# Patient Record
Sex: FEMALE | Race: BLACK OR AFRICAN AMERICAN | NOT HISPANIC OR LATINO | URBAN - METROPOLITAN AREA
[De-identification: names, ages, dates, MRNs, and addresses within clinical notes are randomized per-mention and may not be internally consistent; named-entity substitution may affect disease eponyms.]

---

## 2018-12-03 ENCOUNTER — INPATIENT (INPATIENT)
Facility: HOSPITAL | Age: 73
LOS: 3 days | Discharge: ANOTHER HOSPITAL | End: 2018-12-07
Attending: INTERNAL MEDICINE | Admitting: INTERNAL MEDICINE
Payer: COMMERCIAL

## 2018-12-03 VITALS
SYSTOLIC BLOOD PRESSURE: 146 MMHG | TEMPERATURE: 98 F | WEIGHT: 209 LBS | DIASTOLIC BLOOD PRESSURE: 84 MMHG | OXYGEN SATURATION: 98 % | HEIGHT: 66 IN | RESPIRATION RATE: 16 BRPM | HEART RATE: 68 BPM

## 2018-12-03 LAB
APPEARANCE UR: CLEAR — SIGNIFICANT CHANGE UP
BASOPHILS # BLD AUTO: 0.05 K/UL — SIGNIFICANT CHANGE UP (ref 0–0.2)
BASOPHILS NFR BLD AUTO: 0.6 % — SIGNIFICANT CHANGE UP (ref 0–2)
BILIRUB UR-MCNC: NEGATIVE — SIGNIFICANT CHANGE UP
CK SERPL-CCNC: 173 U/L — SIGNIFICANT CHANGE UP (ref 26–192)
COLOR SPEC: YELLOW — SIGNIFICANT CHANGE UP
DIFF PNL FLD: NEGATIVE — SIGNIFICANT CHANGE UP
EOSINOPHIL # BLD AUTO: 0.27 K/UL — SIGNIFICANT CHANGE UP (ref 0–0.5)
EOSINOPHIL NFR BLD AUTO: 3.5 % — SIGNIFICANT CHANGE UP (ref 0–6)
GLUCOSE UR QL: NEGATIVE MG/DL — SIGNIFICANT CHANGE UP
HCT VFR BLD CALC: 37.7 % — SIGNIFICANT CHANGE UP (ref 34.5–45)
HGB BLD-MCNC: 12.3 G/DL — SIGNIFICANT CHANGE UP (ref 11.5–15.5)
IMM GRANULOCYTES NFR BLD AUTO: 0.4 % — SIGNIFICANT CHANGE UP (ref 0–1.5)
KETONES UR-MCNC: NEGATIVE — SIGNIFICANT CHANGE UP
LEUKOCYTE ESTERASE UR-ACNC: ABNORMAL
LYMPHOCYTES # BLD AUTO: 2.88 K/UL — SIGNIFICANT CHANGE UP (ref 1–3.3)
LYMPHOCYTES # BLD AUTO: 37.1 % — SIGNIFICANT CHANGE UP (ref 13–44)
MCHC RBC-ENTMCNC: 27.2 PG — SIGNIFICANT CHANGE UP (ref 27–34)
MCHC RBC-ENTMCNC: 32.6 GM/DL — SIGNIFICANT CHANGE UP (ref 32–36)
MCV RBC AUTO: 83.4 FL — SIGNIFICANT CHANGE UP (ref 80–100)
MONOCYTES # BLD AUTO: 0.5 K/UL — SIGNIFICANT CHANGE UP (ref 0–0.9)
MONOCYTES NFR BLD AUTO: 6.4 % — SIGNIFICANT CHANGE UP (ref 2–14)
NEUTROPHILS # BLD AUTO: 4.04 K/UL — SIGNIFICANT CHANGE UP (ref 1.8–7.4)
NEUTROPHILS NFR BLD AUTO: 52 % — SIGNIFICANT CHANGE UP (ref 43–77)
NITRITE UR-MCNC: NEGATIVE — SIGNIFICANT CHANGE UP
NRBC # BLD: 0 /100 WBCS — SIGNIFICANT CHANGE UP (ref 0–0)
PH UR: 5 — SIGNIFICANT CHANGE UP (ref 5–8)
PLATELET # BLD AUTO: 292 K/UL — SIGNIFICANT CHANGE UP (ref 150–400)
PROT UR-MCNC: NEGATIVE MG/DL — SIGNIFICANT CHANGE UP
RBC # BLD: 4.52 M/UL — SIGNIFICANT CHANGE UP (ref 3.8–5.2)
RBC # FLD: 13.4 % — SIGNIFICANT CHANGE UP (ref 10.3–14.5)
SP GR SPEC: 1.01 — SIGNIFICANT CHANGE UP (ref 1.01–1.02)
TROPONIN I SERPL-MCNC: 0.04 NG/ML — SIGNIFICANT CHANGE UP (ref 0.01–0.04)
UROBILINOGEN FLD QL: NEGATIVE MG/DL — SIGNIFICANT CHANGE UP
WBC # BLD: 7.77 K/UL — SIGNIFICANT CHANGE UP (ref 3.8–10.5)
WBC # FLD AUTO: 7.77 K/UL — SIGNIFICANT CHANGE UP (ref 3.8–10.5)

## 2018-12-03 PROCEDURE — 99285 EMERGENCY DEPT VISIT HI MDM: CPT

## 2018-12-03 RX ORDER — SODIUM CHLORIDE 9 MG/ML
1000 INJECTION INTRAMUSCULAR; INTRAVENOUS; SUBCUTANEOUS ONCE
Qty: 0 | Refills: 0 | Status: COMPLETED | OUTPATIENT
Start: 2018-12-03 | End: 2018-12-03

## 2018-12-03 RX ADMIN — SODIUM CHLORIDE 1000 MILLILITER(S): 9 INJECTION INTRAMUSCULAR; INTRAVENOUS; SUBCUTANEOUS at 23:22

## 2018-12-03 NOTE — ED ADULT NURSE NOTE - PMH
Arthritis    Asthma, unspecified asthma severity, unspecified whether complicated, unspecified whether persistent

## 2018-12-03 NOTE — ED PROVIDER NOTE - MEDICAL DECISION MAKING DETAILS
Patient with intermittent left sided chest pain and shortness of breath.  Patient's initial EKG was normal, serial EKG shows bradycardia with occasional PACs.  Patient's serial enzymes are normal but trending up.  Will admit for cardiac work up, r/o ACS.  Needs cardiology consult.  Patient is to be admitted to the hospital and the case was discussed with the admitting physician.  Any changes in plan, additional imaging/labs, and further work up will be at the discretion of the admitting physician.

## 2018-12-03 NOTE — ED ADULT TRIAGE NOTE - CHIEF COMPLAINT QUOTE
Patient states I wasn't feeling well so I went to urgent care because I was breathing very fast, they did an ekg and gave me a referral to come here. denies fast breathing in triage

## 2018-12-03 NOTE — ED PROVIDER NOTE - OBJECTIVE STATEMENT
Pertinent PMH/PSH/FHx/SHx and Review of Systems contained within:  Patient presents to the ED for chest pain.  Patient is well appearing, was sent from urgent care for episodes of left sided chest pain under her breast.  Says that for 2 weeks now has been having intermittent left sided chest pain accompanied by palpitations and shortness of breath.  No aggravating or relieving factors.  Currently not having any pain.  Had 2 episodes of nonbloody, nonbilious vomiting today, denies any pain or shortness of breath associated with vomiting.  Denies diarrhea or melena.  Denies fever, cough, hemoptysis, lower extremity swelling or edema. Patient went to urgent care today where EKG was done showing NSR with 1st degree block, patient sent to ER for more extensive work up.     ROS: No fever/chills, No headache/photophobia/eye pain/changes in vision, No ear pain/sore throat/dysphagia, no cough/wheeze/stridor, No abdominal pain, No N/V/D/melena, no dysuria/frequency/discharge, No neck/back pain, no rash, no changes in neurological status/function.

## 2018-12-03 NOTE — ED ADULT NURSE NOTE - CHPI ED NUR SYMPTOMS NEG
no tingling/no vomiting/no nausea/no decreased eating/drinking/no pain/no fever/no chills/no weakness

## 2018-12-04 DIAGNOSIS — R07.89 OTHER CHEST PAIN: ICD-10-CM

## 2018-12-04 DIAGNOSIS — R00.1 BRADYCARDIA, UNSPECIFIED: ICD-10-CM

## 2018-12-04 DIAGNOSIS — J45.909 UNSPECIFIED ASTHMA, UNCOMPLICATED: ICD-10-CM

## 2018-12-04 LAB
ALBUMIN SERPL ELPH-MCNC: 3.4 G/DL — SIGNIFICANT CHANGE UP (ref 3.3–5)
ALP SERPL-CCNC: 99 U/L — SIGNIFICANT CHANGE UP (ref 40–120)
ALT FLD-CCNC: 28 U/L — SIGNIFICANT CHANGE UP (ref 12–78)
AMYLASE P1 CFR SERPL: 68 U/L — SIGNIFICANT CHANGE UP (ref 25–115)
ANION GAP SERPL CALC-SCNC: 7 MMOL/L — SIGNIFICANT CHANGE UP (ref 5–17)
AST SERPL-CCNC: 25 U/L — SIGNIFICANT CHANGE UP (ref 15–37)
BACTERIA # UR AUTO: ABNORMAL
BILIRUB SERPL-MCNC: 0.4 MG/DL — SIGNIFICANT CHANGE UP (ref 0.2–1.2)
BUN SERPL-MCNC: 17 MG/DL — SIGNIFICANT CHANGE UP (ref 7–23)
CALCIUM SERPL-MCNC: 8.9 MG/DL — SIGNIFICANT CHANGE UP (ref 8.5–10.1)
CHLORIDE SERPL-SCNC: 109 MMOL/L — HIGH (ref 96–108)
CO2 SERPL-SCNC: 26 MMOL/L — SIGNIFICANT CHANGE UP (ref 22–31)
CREAT SERPL-MCNC: 1.14 MG/DL — SIGNIFICANT CHANGE UP (ref 0.5–1.3)
GLUCOSE SERPL-MCNC: 111 MG/DL — HIGH (ref 70–99)
LIDOCAIN IGE QN: 270 U/L — SIGNIFICANT CHANGE UP (ref 73–393)
POTASSIUM SERPL-MCNC: 4.5 MMOL/L — SIGNIFICANT CHANGE UP (ref 3.5–5.3)
POTASSIUM SERPL-SCNC: 4.5 MMOL/L — SIGNIFICANT CHANGE UP (ref 3.5–5.3)
PROT SERPL-MCNC: 8.2 GM/DL — SIGNIFICANT CHANGE UP (ref 6–8.3)
SODIUM SERPL-SCNC: 142 MMOL/L — SIGNIFICANT CHANGE UP (ref 135–145)
TROPONIN I SERPL-MCNC: 0.04 NG/ML — SIGNIFICANT CHANGE UP (ref 0.01–0.04)
TSH SERPL-MCNC: 1.66 UU/ML — SIGNIFICANT CHANGE UP (ref 0.36–3.74)
WBC UR QL: SIGNIFICANT CHANGE UP

## 2018-12-04 PROCEDURE — 99223 1ST HOSP IP/OBS HIGH 75: CPT

## 2018-12-04 PROCEDURE — 12345: CPT | Mod: NC

## 2018-12-04 PROCEDURE — 71275 CT ANGIOGRAPHY CHEST: CPT | Mod: 26

## 2018-12-04 PROCEDURE — 93306 TTE W/DOPPLER COMPLETE: CPT | Mod: 26

## 2018-12-04 RX ORDER — HYDRALAZINE HCL 50 MG
25 TABLET ORAL EVERY 12 HOURS
Qty: 0 | Refills: 0 | Status: DISCONTINUED | OUTPATIENT
Start: 2018-12-04 | End: 2018-12-07

## 2018-12-04 RX ORDER — REGADENOSON 0.08 MG/ML
0.4 INJECTION, SOLUTION INTRAVENOUS ONCE
Qty: 0 | Refills: 0 | Status: COMPLETED | OUTPATIENT
Start: 2018-12-04 | End: 2018-12-05

## 2018-12-04 RX ORDER — ASPIRIN/CALCIUM CARB/MAGNESIUM 324 MG
81 TABLET ORAL DAILY
Qty: 0 | Refills: 0 | Status: DISCONTINUED | OUTPATIENT
Start: 2018-12-05 | End: 2018-12-07

## 2018-12-04 RX ORDER — ACETAMINOPHEN 500 MG
650 TABLET ORAL ONCE
Qty: 0 | Refills: 0 | Status: COMPLETED | OUTPATIENT
Start: 2018-12-04 | End: 2018-12-04

## 2018-12-04 RX ORDER — AMLODIPINE BESYLATE 2.5 MG/1
5 TABLET ORAL DAILY
Qty: 0 | Refills: 0 | Status: DISCONTINUED | OUTPATIENT
Start: 2018-12-04 | End: 2018-12-04

## 2018-12-04 RX ORDER — ASPIRIN/CALCIUM CARB/MAGNESIUM 324 MG
325 TABLET ORAL ONCE
Qty: 0 | Refills: 0 | Status: COMPLETED | OUTPATIENT
Start: 2018-12-04 | End: 2018-12-04

## 2018-12-04 RX ADMIN — Medication 650 MILLIGRAM(S): at 00:29

## 2018-12-04 RX ADMIN — SODIUM CHLORIDE 1000 MILLILITER(S): 9 INJECTION INTRAMUSCULAR; INTRAVENOUS; SUBCUTANEOUS at 00:25

## 2018-12-04 RX ADMIN — Medication 650 MILLIGRAM(S): at 02:00

## 2018-12-04 RX ADMIN — Medication 325 MILLIGRAM(S): at 06:03

## 2018-12-04 RX ADMIN — Medication 25 MILLIGRAM(S): at 17:44

## 2018-12-04 NOTE — H&P ADULT - HISTORY OF PRESENT ILLNESS
73y Female PMH asthma, arthritis, presents to the ED for chest pain.  Patient is well appearing, was sent from urgent care for episodes of left sided chest pain under her breast.  Says that for 2 weeks now has been having intermittent left sided chest pain accompanied by palpitations and shortness of breath.  No aggravating or relieving factors. Currently not having any pain.  Had 2 episodes of nonbloody, nonbilious vomiting today, denies any pain or shortness of breath associated with vomiting.  Denies diarrhea or melena.  Denies fever, cough, hemoptysis, lower extremity swelling or edema. Patient went to urgent care today where EKG was done showing NSR with 1st degree block, patient sent to ER for more extensive work up.

## 2018-12-04 NOTE — H&P ADULT - NSHPPHYSICALEXAM_GEN_ALL_CORE
T(C): 36.8 (04 Dec 2018 07:19), Max: 36.8 (03 Dec 2018 21:14)  T(F): 98.2 (04 Dec 2018 07:19), Max: 98.3 (03 Dec 2018 21:14)  HR: 64 (04 Dec 2018 07:19) (63 - 68)  BP: 132/66 (04 Dec 2018 07:19) (132/66 - 153/71)  BP(mean): --  RR: 14 (04 Dec 2018 07:19) (14 - 18)  SpO2: 98% (04 Dec 2018 07:19) (98% - 98%)    PHYSICAL EXAM:  GENERAL: NAD, well-groomed, well-developed  HEAD:  Atraumatic, Normocephalic  EYES: EOMI, PERRLA, conjunctiva and sclera clear  ENMT: No tonsillar erythema, exudates, or enlargement; Moist mucous membranes, Good dentition, No lesions  NECK: Supple, No JVD, Normal thyroid  NERVOUS SYSTEM:  Alert & Oriented X3, Good concentration; Motor Strength 5/5 B/L upper and lower extremities; DTRs 2+ intact and symmetric  CHEST/LUNG: Clear to percussion bilaterally; No rales, rhonchi, wheezing, or rubs  HEART: Regular rate and rhythm; No murmurs, rubs, or gallops  ABDOMEN: Soft, Nontender, Nondistended; Bowel sounds present  EXTREMITIES:  + Peripheral Pulses, No clubbing, cyanosis, or edema  LYMPH: No lymphadenopathy noted  SKIN: No rashes or lesions

## 2018-12-04 NOTE — H&P ADULT - NSHPLABSRESULTS_GEN_ALL_CORE
LABS:                        12.3   7.77  )-----------( 292      ( 03 Dec 2018 23:30 )             37.7         142  |  109<H>  |  17  ----------------------------<  111<H>  4.5   |  26  |  1.14    Ca    8.9      03 Dec 2018 23:30    TPro  8.2  /  Alb  3.4  /  TBili  0.4  /  DBili  x   /  AST  25  /  ALT  28  /  AlkPhos  99        Urinalysis Basic - ( 03 Dec 2018 23:31 )    Color: Yellow / Appearance: Clear / S.015 / pH: x  Gluc: x / Ketone: Negative  / Bili: Negative / Urobili: Negative mg/dL   Blood: x / Protein: Negative mg/dL / Nitrite: Negative   Leuk Esterase: Trace / RBC: x / WBC 3-5   Sq Epi: x / Non Sq Epi: x / Bacteria: Occasional      CAPILLARY BLOOD GLUCOSE  Troponin I, Serum: .040: The new reference range for Troponin-I performed on the Siemens Vista  system is 0.015-0.045 ng/mL, which includes the 99th percentile of a  healthy reference population. Studies have shown that elevated troponin  levels above the 99th percentile cutoff are associated with an increased  risk for adverse cardiac events, with the risk increasing as troponin  levels increase. As per a joint committee of the American College of  Cardiology and European Society of Cardiology, diagnosis of classic MI is  based upon the detection of a rise or fall of cardiac troponin values,  with at least one value above the 99th percentile upper reference limit,  in the appropriate clinical context.  Troponin-I (ng/mL) Interpretation  0.00-0.045 Normal range (includes the 99th percentile of a healthy  reference population)  >0.045 Elevated troponin level indicating increased risk  Note: Troponin-I and Troponin-T cannot be used interchangeably in serial  measurements. Minimally elevated Troponin results should be interpreted  in the context of clinical findings and risk factors. ng/mL (18 @ 02:49)                RADIOLOGY & ADDITIONAL TESTS:  < from: CT Angio Chest w/ IV Cont (18 @ 01:08) >    There is good opacification of pulmonary arterial tree. No filling defect   is present to suggest acute pulmonary embolus.    There is a 2.6 cm hypodense nodule in the left lobe of the thyroid gland.    Evaluation of the lung parenchyma demonstrates no suspicious pulmonary   nodule or mass. There is a tiny calcified granuloma in the left lower   lobe. There is no evidence of pneumonia. There is no pneumothorax.  There   is no pleural effusion. The tracheobronchial tree is patent.    There is no significant axillary, mediastinal or hilar adenopathy.    The heart is upper limits of normal in size. There is no pericardial   effusion. The thoracic aorta is normal in caliber without dissection.     Limited images of the upper abdomen demonstrate no acute abnormality.   There is a small hiatal hernia.    There are no acute osseous abnormalities.    < end of copied text >      Imaging Personally Reviewed:  [x ] YES  [ ] NO  EKG sinus@ 54 with APC, no acute ischemic changes

## 2018-12-04 NOTE — ED ADULT NURSE REASSESSMENT NOTE - NS ED NURSE REASSESS COMMENT FT1
covering primary RN break. pt awake alert and oriented x 3. pt co headache. EDMD notified and pt medicated with Tylenol as per orders. pt placed on cardiac monitor. pending CT scan. side rails up x 1. respirations even and unlabored, skin warm and dry.
Pt is awake and oriented. She is admitted to telemetry pending bed assignment.

## 2018-12-04 NOTE — CONSULT NOTE ADULT - ASSESSMENT
72yo lady with a PMH asthma, arthritis; presents to the ED from urgent care with chest pain. Has been having 2 weeks of intermittent left sided chest pain accompanied by palpitations and shortness of breath.  No aggravating or relieving factors. Currently not having any pain.  Had 2 episodes of nonbloody, nonbilious vomiting day prior to admit, denies any pain or shortness of breath associated with vomiting.  Denies diarrhea or melena.  Denies fever, cough, hemoptysis, lower extremity swelling or edema.  Denies any prior cardiac hx.  ECG: sinus cecilio 54bpm with PVCs; sinus 80s with LVH  Fernando: 0.03-0.04    -cont asa  -start amlodipine daily; SBPs 150s.  Unable to give bb 2/2 HRs 50-60s and pt allergic to ACE  -trend Fernando  -2D echo  -stress test in AM

## 2018-12-04 NOTE — CONSULT NOTE ADULT - SUBJECTIVE AND OBJECTIVE BOX
CARDIOLOGY CONSULT NOTE    Patient is a 73y Female with a known history of :  Asthma, unspecified asthma severity, unspecified whether complicated, unspecified whether persistent (J45.909)  Bradycardia, unspecified (R00.1)  Other chest pain (R07.89)    HPI:  74yo lady with a PMH asthma, arthritis; presents to the ED from urgent care with chest pain. Has been having 2 weeks of intermittent left sided chest pain accompanied by palpitations and shortness of breath.  No aggravating or relieving factors. Currently not having any pain.  Had 2 episodes of nonbloody, nonbilious vomiting day prior to admit, denies any pain or shortness of breath associated with vomiting.  Denies diarrhea or melena.  Denies fever, cough, hemoptysis, lower extremity swelling or edema.  Denies any prior cardiac hx.  ECG: sinus cecilio 54bpm with PVCs; sinus 80s with LVH  Fernando: 0.03-0.04      REVIEW OF SYSTEMS:    CONSTITUTIONAL: No fever, weight loss, or fatigue  EYES: No eye pain, visual disturbances, or discharge  ENMT:  No difficulty hearing, tinnitus, vertigo; No sinus or throat pain  NECK: No pain or stiffness  BREASTS: No pain, masses, or nipple discharge  RESPIRATORY: No cough, wheezing, chills or hemoptysis; + shortness of breath  CARDIOVASCULAR: +chest pain, palpitations; no dizziness, or leg swelling  GASTROINTESTINAL: No abdominal or epigastric pain. No nausea, vomiting, or hematemesis; No diarrhea or constipation. No melena or hematochezia.  GENITOURINARY: No dysuria, frequency, hematuria, or incontinence  NEUROLOGICAL: No headaches, memory loss, loss of strength, numbness, or tremors  SKIN: No itching, burning, rashes, or lesions   LYMPH NODES: No enlarged glands  ENDOCRINE: No heat or cold intolerance; No hair loss  MUSCULOSKELETAL: No joint pain or swelling; No muscle, back, or extremity pain  PSYCHIATRIC: No depression, anxiety, mood swings, or difficulty sleeping  HEME/LYMPH: No easy bruising, or bleeding gums  ALLERGY AND IMMUNOLOGIC: No hives or eczema    MEDICATIONS  (STANDING):  aspirin enteric coated 81 milliGRAM(s) Oral daily    MEDICATIONS  (PRN):      ALLERGIES: lisinopril (Angioedema)      FAMILY HISTORY:  No pertinent family history in first degree relatives      PHYSICAL EXAMINATION:  -----------------------------  T(C): 36.2 (12-04-18 @ 12:36), Max: 36.8 (12-03-18 @ 21:14)  HR: 60 (12-04-18 @ 12:36) (60 - 91)  BP: 153/68 (12-04-18 @ 12:36) (132/66 - 153/71)  RR: 17 (12-04-18 @ 12:36) (12 - 18)  SpO2: 99% (12-04-18 @ 12:36) (98% - 99%)  Wt(kg): --    Height (cm): 167.64 (12-03 @ 21:14)  Weight (kg): 98.7 (12-04 @ 12:36)  BMI (kg/m2): 35.1 (12-04 @ 12:36)  BSA (m2): 2.07 (12-04 @ 12:36)    Constitutional: well developed, normal appearance, well groomed, well nourished, no deformities and no acute distress.   Eyes: the conjunctiva exhibited no abnormalities and the eyelids demonstrated no xanthelasmas.   HEENT: normal oral mucosa, no oral pallor and no oral cyanosis.   Neck: normal jugular venous A waves present, normal jugular venous V waves present and no jugular venous garcia A waves.   Pulmonary: no respiratory distress, normal respiratory rhythm and effort, no accessory muscle use and lungs were clear to auscultation bilaterally.   Cardiovascular: heart rate and rhythm were normal, normal S1 and S2 and no murmur, gallop, rub, heave or thrill are present.   Abdomen: soft, non-tender, no hepato-splenomegaly and no abdominal mass palpated.   Musculoskeletal: the gait could not be assessed..   Extremities: no clubbing of the fingernails, no localized cyanosis, no petechial hemorrhages and no ischemic changes.   Skin: normal skin color and pigmentation, no rash, no venous stasis, no skin lesions, no skin ulcer and no xanthoma was observed.   Psychiatric: oriented to person, place, and time, the affect was normal, the mood was normal and not feeling anxious.     LABS:   --------  12-03    142  |  109<H>  |  17  ----------------------------<  111<H>  4.5   |  26  |  1.14    Ca    8.9      03 Dec 2018 23:30    TPro  8.2  /  Alb  3.4  /  TBili  0.4  /  DBili  x   /  AST  25  /  ALT  28  /  AlkPhos  99  12-03                         12.3   7.77  )-----------( 292      ( 03 Dec 2018 23:30 )             37.7         12-04 @ 02:49 CPK total:--, CKMB --, Troponin I - .040 ng/mL  12-03 @ 23:30 CPK total:--, CKMB --, Troponin I - .037 ng/mL      RADIOLOGY:  -----------------    ECG: sinus cceilio 54bpm with PVCs; sinus 80s with LVH

## 2018-12-04 NOTE — H&P ADULT - ASSESSMENT
73y Female PMH asthma, arthritis, presents to the ED for chest pain.  CP free at present, om monitor has episodes bradycardia, on no rate controlling medications. Troponin negative x2, no acute ischemic changes.  IMPROVE VTE Individual Risk Assessment          RISK                                                          Points    [  ] Previous VTE                                                3    [  ] Thrombophilia                                             2    [  ] Lower limb paralysis                                    2        (unable to hold up >15 seconds)      [  ] Current Cancer                                             2         (within 6 months)    [  ] Immobilization > 24 hrs                              1    [  ] ICU/CCU stay > 24 hours                            1    [ x ] Age > 60                                                    1    IMPROVE VTE Score __1_______

## 2018-12-04 NOTE — CHART NOTE - NSCHARTNOTEFT_GEN_A_CORE
seen and examined    echo pending, cardio consult    Vital Signs Last 24 Hrs  T(C): 36.2 (04 Dec 2018 12:36), Max: 36.8 (03 Dec 2018 21:14)  T(F): 97.2 (04 Dec 2018 12:36), Max: 98.3 (03 Dec 2018 21:14)  HR: 60 (04 Dec 2018 12:36) (60 - 91)  BP: 153/68 (04 Dec 2018 12:36) (132/66 - 153/71)  BP(mean): --  RR: 17 (04 Dec 2018 12:36) (12 - 18)  SpO2: 99% (04 Dec 2018 12:36) (98% - 99%)                            12.3   7.77  )-----------( 292      ( 03 Dec 2018 23:30 )             37.7     12-03    142  |  109<H>  |  17  ----------------------------<  111<H>  4.5   |  26  |  1.14    Ca    8.9      03 Dec 2018 23:30    TPro  8.2  /  Alb  3.4  /  TBili  0.4  /  DBili  x   /  AST  25  /  ALT  28  /  AlkPhos  99  12-03      Urinalysis Basic - ( 03 Dec 2018 23:31 )    Color: Yellow / Appearance: Clear / S.015 / pH: x  Gluc: x / Ketone: Negative  / Bili: Negative / Urobili: Negative mg/dL   Blood: x / Protein: Negative mg/dL / Nitrite: Negative   Leuk Esterase: Trace / RBC: x / WBC 3-5   Sq Epi: x / Non Sq Epi: x / Bacteria: Occasional

## 2018-12-04 NOTE — H&P ADULT - NSHPREVIEWOFSYSTEMS_GEN_ALL_CORE
ROS: No fever/chills, No headache/photophobia/eye pain/changes in vision, No ear pain/sore throat/dysphagia, no cough/wheeze/stridor, No abdominal pain, No N/V/D/melena, no dysuria/frequency/discharge, No neck/back pain, no rash, no changes in neurological status/function.

## 2018-12-05 LAB
ANION GAP SERPL CALC-SCNC: 8 MMOL/L — SIGNIFICANT CHANGE UP (ref 5–17)
BUN SERPL-MCNC: 22 MG/DL — SIGNIFICANT CHANGE UP (ref 7–23)
CALCIUM SERPL-MCNC: 9 MG/DL — SIGNIFICANT CHANGE UP (ref 8.5–10.1)
CHLORIDE SERPL-SCNC: 109 MMOL/L — HIGH (ref 96–108)
CO2 SERPL-SCNC: 25 MMOL/L — SIGNIFICANT CHANGE UP (ref 22–31)
CREAT SERPL-MCNC: 1.07 MG/DL — SIGNIFICANT CHANGE UP (ref 0.5–1.3)
CULTURE RESULTS: SIGNIFICANT CHANGE UP
GLUCOSE SERPL-MCNC: 97 MG/DL — SIGNIFICANT CHANGE UP (ref 70–99)
MAGNESIUM SERPL-MCNC: 2.5 MG/DL — SIGNIFICANT CHANGE UP (ref 1.6–2.6)
PHOSPHATE SERPL-MCNC: 4.3 MG/DL — SIGNIFICANT CHANGE UP (ref 2.5–4.5)
POTASSIUM SERPL-MCNC: 4.2 MMOL/L — SIGNIFICANT CHANGE UP (ref 3.5–5.3)
POTASSIUM SERPL-SCNC: 4.2 MMOL/L — SIGNIFICANT CHANGE UP (ref 3.5–5.3)
SODIUM SERPL-SCNC: 142 MMOL/L — SIGNIFICANT CHANGE UP (ref 135–145)
SPECIMEN SOURCE: SIGNIFICANT CHANGE UP

## 2018-12-05 PROCEDURE — 78452 HT MUSCLE IMAGE SPECT MULT: CPT | Mod: 26

## 2018-12-05 PROCEDURE — 99232 SBSQ HOSP IP/OBS MODERATE 35: CPT

## 2018-12-05 RX ORDER — HYDRALAZINE HCL 50 MG
1 TABLET ORAL
Qty: 0 | Refills: 0 | COMMUNITY
Start: 2018-12-05

## 2018-12-05 RX ORDER — ASPIRIN/CALCIUM CARB/MAGNESIUM 324 MG
1 TABLET ORAL
Qty: 0 | Refills: 0 | DISCHARGE
Start: 2018-12-05

## 2018-12-05 RX ADMIN — Medication 81 MILLIGRAM(S): at 13:33

## 2018-12-05 RX ADMIN — Medication 25 MILLIGRAM(S): at 17:18

## 2018-12-05 RX ADMIN — REGADENOSON 0.4 MILLIGRAM(S): 0.08 INJECTION, SOLUTION INTRAVENOUS at 11:28

## 2018-12-05 NOTE — PROGRESS NOTE ADULT - SUBJECTIVE AND OBJECTIVE BOX
Patient is a 73y old  Female who presents with a chief complaint of Chest pain, sent from urgent care. (04 Dec 2018 13:23)      INTERVAL HPI/OVERNIGHT EVENTS: bradycardic overnight, asymptomatic     MEDICATIONS  (STANDING):  aspirin enteric coated 81 milliGRAM(s) Oral daily  hydrALAZINE 25 milliGRAM(s) Oral every 12 hours    MEDICATIONS  (PRN):      Allergies    lisinopril (Angioedema)    Intolerances        REVIEW OF SYSTEMS:  CONSTITUTIONAL: No fever, weight loss, or fatigue  EYES: No eye pain, visual disturbances, or discharge  ENMT:  No difficulty hearing, tinnitus, vertigo; No sinus or throat pain  NECK: No pain or stiffness  BREASTS: No pain, masses, or nipple discharge  RESPIRATORY: No cough, wheezing, chills or hemoptysis; No shortness of breath  CARDIOVASCULAR: No chest pain, palpitations, dizziness, or leg swelling  GASTROINTESTINAL: No abdominal or epigastric pain. No nausea, vomiting, or hematemesis; No diarrhea or constipation. No melena or hematochezia.  GENITOURINARY: No dysuria, frequency, hematuria, or incontinence  NEUROLOGICAL: No headaches, memory loss, loss of strength, numbness, or tremors  SKIN: No itching, burning, rashes, or lesions   LYMPH NODES: No enlarged glands  ENDOCRINE: No heat or cold intolerance; No hair loss  MUSCULOSKELETAL: No joint pain or swelling; No muscle, back, or extremity pain  PSYCHIATRIC: No depression, anxiety, mood swings, or difficulty sleeping  HEME/LYMPH: No easy bruising, or bleeding gums  ALLERGY AND IMMUNOLOGIC: No hives or eczema    Vital Signs Last 24 Hrs  T(C): 36.1 (05 Dec 2018 14:28), Max: 36.5 (05 Dec 2018 00:28)  T(F): 96.9 (05 Dec 2018 14:28), Max: 97.7 (05 Dec 2018 00:28)  HR: 97 (05 Dec 2018 14:28) (55 - 97)  BP: 133/62 (05 Dec 2018 14:28) (113/50 - 133/62)  BP(mean): --  RR: 17 (05 Dec 2018 14:28) (17 - 18)  SpO2: 99% (05 Dec 2018 14:28) (98% - 99%)    PHYSICAL EXAM:  GENERAL: NAD, well-groomed, well-developed  HEAD:  Atraumatic, Normocephalic  EYES: EOMI, PERRLA, conjunctiva and sclera clear  ENMT: No tonsillar erythema, exudates, or enlargement; Moist mucous membranes, Good dentition, No lesions  NECK: Supple, No JVD, Normal thyroid  NERVOUS SYSTEM:  Alert & Oriented X3, Good concentration; Motor Strength 5/5 B/L upper and lower extremities; DTRs 2+ intact and symmetric  CHEST/LUNG: Clear to percussion bilaterally; No rales, rhonchi, wheezing, or rubs  HEART: Regular rate and rhythm; No murmurs, rubs, or gallops  ABDOMEN: Soft, Nontender, Nondistended; Bowel sounds present  EXTREMITIES:  2+ Peripheral Pulses, No clubbing, cyanosis, or edema  LYMPH: No lymphadenopathy noted  SKIN: No rashes or lesions    LABS:                        12.3   7.77  )-----------( 292      ( 03 Dec 2018 23:30 )             37.7     12-05    142  |  109<H>  |  22  ----------------------------<  97  4.2   |  25  |  1.07    Ca    9.0      05 Dec 2018 06:34  Phos  4.3     12-05  Mg     2.5     12-05    TPro  8.2  /  Alb  3.4  /  TBili  0.4  /  DBili  x   /  AST  25  /  ALT  28  /  AlkPhos  99  12-03      Urinalysis Basic - ( 03 Dec 2018 23:31 )    Color: Yellow / Appearance: Clear / S.015 / pH: x  Gluc: x / Ketone: Negative  / Bili: Negative / Urobili: Negative mg/dL   Blood: x / Protein: Negative mg/dL / Nitrite: Negative   Leuk Esterase: Trace / RBC: x / WBC 3-5   Sq Epi: x / Non Sq Epi: x / Bacteria: Occasional      CAPILLARY BLOOD GLUCOSE          RADIOLOGY & ADDITIONAL TESTS:    Imaging Personally Reviewed:  [ X] YES  [ ] NO    Consultant(s) Notes Reviewed:  [ X] YES  [ ] NO    Care Discussed with Consultants/Other Providers [X ] YES  [ ] NO

## 2018-12-05 NOTE — PROGRESS NOTE ADULT - ASSESSMENT
73y Female PMH asthma, arthritis, presents to the ED for chest pain.  CP free at present, om monitor has episodes bradycardia, on no rate controlling medications. Troponin negative x2, no acute ischemic changes.

## 2018-12-05 NOTE — DISCHARGE NOTE ADULT - MEDICATION SUMMARY - MEDICATIONS TO TAKE
I will START or STAY ON the medications listed below when I get home from the hospital:    aspirin 81 mg oral delayed release tablet  -- 1 tab(s) by mouth once a day  -- Indication: For Cardiac protection     meclizine 12.5 mg oral tablet  -- 1 tab(s) by mouth 3 times a day, As Needed  -- Indication: For vetrigo    hydrALAZINE 25 mg oral tablet  -- 1 tab(s) by mouth every 12 hours  -- Indication: For hypertension

## 2018-12-05 NOTE — PROGRESS NOTE ADULT - SUBJECTIVE AND OBJECTIVE BOX
Patient is a 73y old  Female who presents with a chief complaint of Chest pain, sent from urgent care. (05 Dec 2018 17:00)      PAST MEDICAL & SURGICAL HISTORY:  Asthma, unspecified asthma severity, unspecified whether complicated, unspecified whether persistent  Arthritis  HTN  No significant past surgical history      INTERVAL HISTORY: resting in bed, not in any acute distress , no c/o chest pain or sob at present, had experienced palpitation  after having stress test today, also had it last night and she was feeling tired and dizzy  right before experiencing palpitation/chest tightness   	  MEDICATIONS:  MEDICATIONS  (STANDING):  aspirin enteric coated 81 milliGRAM(s) Oral daily  hydrALAZINE 25 milliGRAM(s) Oral every 12 hours    MEDICATIONS  (PRN):      Vitals:  T(F): 98.4 (12-05-18 @ 17:12), Max: 98.4 (12-05-18 @ 17:12)  HR: 89 (12-05-18 @ 17:12) (55 - 97)  BP: 133/83 (12-05-18 @ 17:12) (113/50 - 133/83)  RR: 18 (12-05-18 @ 17:12) (17 - 18)  SpO2: 97% (12-05-18 @ 17:12) (97% - 99%)    12-04 @ 07:01  -  12-05 @ 07:00  --------------------------------------------------------  IN:    Oral Fluid: 600 mL  Total IN: 600 mL    OUT:  Total OUT: 0 mL    Total NET: 600 mL      12-05 @ 07:01  -  12-05 @ 18:40  --------------------------------------------------------  IN:    Oral Fluid: 670 mL  Total IN: 670 mL    OUT:  Total OUT: 0 mL    Total NET: 670 mL        Weight (kg): 98.7 (12-04 @ 12:36)  BMI (kg/m2): 35.1 (12-04 @ 12:36)      PHYSICAL EXAM:  Neuro: Awake, responsive  CV: S1 S2 RRR  Lungs: CTABL  GI: Soft, BS +, ND, NT  Extremities: No edema    TELEMETRY: RSR, events of bradycardia in 30s last night, at times HR ~ 100 -120s  	    RADIOLOGY: < from: CT Angio Chest w/ IV Cont (12.04.18 @ 01:08) >  Evaluation of the lung parenchyma demonstrates no suspicious pulmonary   nodule or mass. There is a tiny calcified granuloma in the left lower   lobe. There is no evidence of pneumonia. There is no pneumothorax.  There   is no pleural effusion. The tracheobronchial tree is patent.    There is no significant axillary, mediastinal or hilar adenopathy.    The heart is upper limits of normal in size. There is no pericardial   effusion. The thoracic aorta is normal in caliberwithout dissection.     Limited images of the upper abdomen demonstrate no acute abnormality.   There is a small hiatal hernia.    There are no acute osseous abnormalities.    IMPRESSION:  No pulmonary embolism.     < end of copied text >      DIAGNOSTIC TESTING:    [x ] Echocardiogram: < from: TTE Echo Doppler w/o Cont (12.04.18 @ 19:15) >   1. Left ventricular ejection fraction, by visual estimation, is 55 to   60%.   2. There is no left ventricular hypertrophy.   3. Mild mitral annular calcification.   4. Trace tricuspid regurgitation.    < end of copied text >    [x ] Stress Test:   < from: [facility] Pharm Stress Test, Dual Isotope (12.05.18 @ 13:11) >  Normal SPECT Myocardial Perfusion Imaging.     Normal left ventricular function with an ejection fraction of 60%   (Normal: 50% or greater).    No regional wall motion abnormalities.    No evidence of reversible or fixed perfusion defects.    < end of copied text >      LABS:	 	    CARDIAC MARKERS:  Troponin I, Serum: .040 ng/mL (12-04 @ 02:49)  Troponin I, Serum: .037 ng/mL (12-03 @ 23:30)      05 Dec 2018 06:34    142    |  109    |  22     ----------------------------<  97     4.2     |  25     |  1.07   03 Dec 2018 23:30    142    |  109    |  17     ----------------------------<  111    4.5     |  26     |  1.14     Ca    9.0        05 Dec 2018 06:34  Phos  4.3       05 Dec 2018 06:34  Mg     2.5       05 Dec 2018 06:34    TPro  8.2    /  Alb  3.4    /  TBili  0.4    /  DBili  x      /  AST  25     /  ALT  28     /  AlkPhos  99     03 Dec 2018 23:30                          12.3   7.77  )-----------( 292      ( 03 Dec 2018 23:30 )             37.7     TSH: Thyroid Stimulating Hormone, Serum: 1.660 uU/mL (12-03 @ 23:30)

## 2018-12-05 NOTE — PROGRESS NOTE ADULT - ASSESSMENT
74yo lady with a PMH asthma, arthritis; presents to the ED  with c/o palpitation and chest tightness lasting for a short period of time. palpitation preceded with tiredness and dizziness. Has been having these events for over a month, seen by her PCP dr. Banegas, and was placed on Antivert for dizziness with no effect. recently symptoms have been more persistent.   No aggravating or relieving factors.  Denies any prior cardiac hx.    Currently not having any pain. last event was early this afternoon,  trop neg x 3  RSR on tele with events of bradycardia in 30s last evening/ night and with HR going up to 120s at times early am, otherwise mostly 90-100s, concern for SSS  2D echo: normal EF  stress test: normal study    Plan  -continue on telemetry   -cont asa  -c/w hydralazine for BP  -normal TSH, electrolytes wnl  -check Lipid panel/A1c  -will discuss with EPS at [facility]  for the need for further work up for tele findings: probable tachybrady arrhythmia, discussed with pt/family and agrees with the plan

## 2018-12-05 NOTE — PROGRESS NOTE ADULT - PROBLEM SELECTOR PLAN 1
monitor telemetry, echo pending , avoid meds reducing heart rate,    cardio on board     Results of stress test pending

## 2018-12-05 NOTE — DISCHARGE NOTE ADULT - PATIENT PORTAL LINK FT
You can access the [facility] Patient Portal, offered by [facility], by registering with the following website: [email or web address]

## 2018-12-06 LAB
CHOLEST SERPL-MCNC: 169 MG/DL — SIGNIFICANT CHANGE UP (ref 10–199)
HBA1C BLD-MCNC: 6.5 % — HIGH (ref 4–5.6)
HDLC SERPL-MCNC: 49 MG/DL — LOW
LIPID PNL WITH DIRECT LDL SERPL: 107 MG/DL — SIGNIFICANT CHANGE UP
TOTAL CHOLESTEROL/HDL RATIO MEASUREMENT: 3.4 RATIO — SIGNIFICANT CHANGE UP (ref 3.3–7.1)
TRIGL SERPL-MCNC: 64 MG/DL — SIGNIFICANT CHANGE UP (ref 10–149)

## 2018-12-06 PROCEDURE — 99232 SBSQ HOSP IP/OBS MODERATE 35: CPT

## 2018-12-06 RX ADMIN — Medication 25 MILLIGRAM(S): at 17:16

## 2018-12-06 RX ADMIN — Medication 81 MILLIGRAM(S): at 12:17

## 2018-12-06 NOTE — PROGRESS NOTE ADULT - PROBLEM SELECTOR PLAN 1
monitor telemetry, echo wnl , avoid meds reducing heart rate,    cardio on board     stress test normal

## 2018-12-06 NOTE — PROGRESS NOTE ADULT - SUBJECTIVE AND OBJECTIVE BOX
Patient is a 73y old  Female who presents with a chief complaint of Chest pain, sent from urgent care. (05 Dec 2018 18:40)      INTERVAL HPI/OVERNIGHT EVENTS: no events     MEDICATIONS  (STANDING):  aspirin enteric coated 81 milliGRAM(s) Oral daily  hydrALAZINE 25 milliGRAM(s) Oral every 12 hours    MEDICATIONS  (PRN):      Allergies    lisinopril (Angioedema)    Intolerances             Vital Signs Last 24 Hrs  T(C): 36.6 (06 Dec 2018 12:56), Max: 36.9 (05 Dec 2018 17:12)  T(F): 97.9 (06 Dec 2018 12:56), Max: 98.4 (05 Dec 2018 17:12)  HR: 97 (06 Dec 2018 12:56) (50 - 97)  BP: 127/98 (06 Dec 2018 12:56) (115/52 - 133/83)  BP(mean): --  RR: 18 (06 Dec 2018 12:56) (18 - 18)  SpO2: 100% (06 Dec 2018 12:56) (94% - 100%)    PHYSICAL EXAM:  GENERAL: NAD, well-groomed, well-developed  HEAD:  Atraumatic, Normocephalic  EYES: EOMI, PERRLA, conjunctiva and sclera clear  ENMT: No tonsillar erythema, exudates, or enlargement; Moist mucous membranes, Good dentition, No lesions  NECK: Supple, No JVD, Normal thyroid  NERVOUS SYSTEM:  Alert & Oriented X3, Good concentration; Motor Strength 5/5 B/L upper and lower extremities; DTRs 2+ intact and symmetric  CHEST/LUNG: Clear to percussion bilaterally; No rales, rhonchi, wheezing, or rubs  HEART: Regular rate and rhythm; No murmurs, rubs, or gallops  ABDOMEN: Soft, Nontender, Nondistended; Bowel sounds present  EXTREMITIES:  2+ Peripheral Pulses, No clubbing, cyanosis, or edema  LYMPH: No lymphadenopathy noted  SKIN: No rashes or lesions    LABS:    12-05    142  |  109<H>  |  22  ----------------------------<  97  4.2   |  25  |  1.07    Ca    9.0      05 Dec 2018 06:34  Phos  4.3     12-05  Mg     2.5     12-05          CAPILLARY BLOOD GLUCOSE          RADIOLOGY & ADDITIONAL TESTS:    Imaging Personally Reviewed:  [ X] YES  [ ] NO    Consultant(s) Notes Reviewed:  [ X] YES  [ ] NO    Care Discussed with Consultants/Other Providers [X ] YES  [ ] NO

## 2018-12-06 NOTE — PROGRESS NOTE ADULT - ASSESSMENT
72yo lady with a PMH asthma, arthritis; presents to the ED  with c/o palpitation and chest tightness lasting for a short period of time. palpitation preceded with tiredness, sob, and dizziness. Has been having these events for over a month, seen by her PCP dr. Banegas, and was placed on Antivert for dizziness with no effect. recently symptoms have been more persistent.   No aggravating or relieving factors.  Denies any prior cardiac hx.    event of feeling tired/sob/dizziness/ palpitation again last night, correlating with bradycardia as per nursing reported  Currently not having any pain.  trop neg x 3  RSR on tele with events of bradycardia in 30s last evening/ night   2D echo: normal EF  stress test: normal study    Plan  -continue on telemetry   -cont asa  -c/w hydralazine for BP  -normal TSH, electrolytes wnl  -check Lipid panel/A1c  - discussed with EPS at [facility]  for the need for further work up for tele findings: probable tachybrady arrhythmia/SSS, discussed with pt/family and agrees with the plan, pt will be transferred to [facility] for EPS, awaiting bed availability

## 2018-12-06 NOTE — PROGRESS NOTE ADULT - SUBJECTIVE AND OBJECTIVE BOX
Patient is a 73y old  Female who presents with a chief complaint of Chest pain, sent from urgent care. (05 Dec 2018 18:40)      PAST MEDICAL & SURGICAL HISTORY:  Asthma, unspecified asthma severity, unspecified whether complicated, unspecified whether persistent  Arthritis  No significant past surgical history      INTERVAL HISTORY: resting in bed, not in any acute distress, event of feeling tired/sob/dizziness/ palpitation again last night, currently with complaints   	  MEDICATIONS:  MEDICATIONS  (STANDING):  aspirin enteric coated 81 milliGRAM(s) Oral daily  hydrALAZINE 25 milliGRAM(s) Oral every 12 hours    MEDICATIONS  (PRN):      Vitals:  T(F): 97.9 (12-06-18 @ 12:56), Max: 98.4 (12-05-18 @ 17:12)  HR: 97 (12-06-18 @ 12:56) (50 - 97)  BP: 127/98 (12-06-18 @ 12:56) (115/52 - 133/83)  RR: 18 (12-06-18 @ 12:56) (18 - 18)  SpO2: 100% (12-06-18 @ 12:56) (94% - 100%)  Wt(kg): -- 98.8 kg    12-05 @ 07:01  -  12-06 @ 07:00  --------------------------------------------------------  IN:    Oral Fluid: 1030 mL  Total IN: 1030 mL    OUT:    Voided: 2 mL  Total OUT: 2 mL    Total NET: 1028 mL      12-06 @ 07:01  -  12-06 @ 14:42  --------------------------------------------------------  IN:    Oral Fluid: 600 mL  Total IN: 600 mL    OUT:  Total OUT: 0 mL    Total NET: 600 mL        Weight (kg): 98.7 (12-04 @ 12:36)  BMI (kg/m2): 35.1 (12-04 @ 12:36)      PHYSICAL EXAM:  Neuro: Awake, responsive  CV: S1 S2 RRR  Lungs: CTABL  GI: Soft, BS +, ND, NT  Extremities: No edema    TELEMETRY: RSR with events of bradycardia in 30s     RADIOLOGY: < from: TTE Echo Doppler w/o Cont (12.04.18 @ 19:15) >   1. Left ventricular ejection fraction, by visual estimation, is 55 to   60%.   2. There is no left ventricular hypertrophy.   3. Mild mitral annular calcification.   4. Trace tricuspid regurgitation.    < end of copied text >      DIAGNOSTIC TESTING:    [x ] Echocardiogram: < from: TTE Echo Doppler w/o Cont (12.04.18 @ 19:15) >   1. Left ventricular ejection fraction, by visual estimation, is 55 to   60%.   2. There is no left ventricular hypertrophy.   3. Mild mitral annular calcification.   4. Trace tricuspid regurgitation.    < end of copied text >    [x ] Stress Test:  < from: NM Pharm Stress Test, Dual Isotope (12.05.18 @ 13:11) >  Normal SPECT Myocardial Perfusion Imaging.     Normal left ventricular function with an ejection fraction of 60%   (Normal: 50% or greater).    No regional wall motion abnormalities.    No evidence of reversible or fixed perfusion defects.    < end of copied text >    LABS:	 	    CARDIAC MARKERS:  Troponin I, Serum: .040 ng/mL (12-04 @ 02:49)  Troponin I, Serum: .037 ng/mL (12-03 @ 23:30)    05 Dec 2018 06:34    142    |  109    |  22     ----------------------------<  97     4.2     |  25     |  1.07   03 Dec 2018 23:30    142    |  109    |  17     ----------------------------<  111    4.5     |  26     |  1.14     Ca    9.0        05 Dec 2018 06:34  Phos  4.3       05 Dec 2018 06:34  Mg     2.5       05 Dec 2018 06:34                        12.3   7.77  )-----------( 292      ( 03 Dec 2018 23:30 )             37.7     HgA1c: Hemoglobin A1C, Whole Blood: 6.5 % (12-06 @ 08:00)    TSH: Thyroid Stimulating Hormone, Serum: 1.660 uU/mL (12-03 @ 23:30)

## 2018-12-06 NOTE — PROGRESS NOTE ADULT - REASON FOR ADMISSION
Chest pain, sent from urgent care.

## 2018-12-07 ENCOUNTER — TRANSCRIPTION ENCOUNTER (OUTPATIENT)
Age: 73
End: 2018-12-07

## 2018-12-07 ENCOUNTER — INPATIENT (INPATIENT)
Facility: HOSPITAL | Age: 73
LOS: 0 days | Discharge: HOME | DRG: 244 | End: 2018-12-08
Attending: INTERNAL MEDICINE | Admitting: INTERNAL MEDICINE
Payer: COMMERCIAL

## 2018-12-07 VITALS
WEIGHT: 209 LBS | OXYGEN SATURATION: 96 % | HEART RATE: 82 BPM | DIASTOLIC BLOOD PRESSURE: 89 MMHG | SYSTOLIC BLOOD PRESSURE: 132 MMHG | RESPIRATION RATE: 16 BRPM | TEMPERATURE: 98 F | HEIGHT: 66 IN

## 2018-12-07 VITALS
TEMPERATURE: 98 F | SYSTOLIC BLOOD PRESSURE: 144 MMHG | DIASTOLIC BLOOD PRESSURE: 92 MMHG | RESPIRATION RATE: 18 BRPM | OXYGEN SATURATION: 98 % | HEART RATE: 75 BPM

## 2018-12-07 DIAGNOSIS — Z90.710 ACQUIRED ABSENCE OF BOTH CERVIX AND UTERUS: Chronic | ICD-10-CM

## 2018-12-07 DIAGNOSIS — I49.9 CARDIAC ARRHYTHMIA, UNSPECIFIED: ICD-10-CM

## 2018-12-07 PROBLEM — M19.90 UNSPECIFIED OSTEOARTHRITIS, UNSPECIFIED SITE: Chronic | Status: ACTIVE | Noted: 2018-12-03

## 2018-12-07 PROBLEM — J45.909 UNSPECIFIED ASTHMA, UNCOMPLICATED: Chronic | Status: ACTIVE | Noted: 2018-12-03

## 2018-12-07 LAB
ANION GAP SERPL CALC-SCNC: 13 MMOL/L — SIGNIFICANT CHANGE UP (ref 5–17)
APTT BLD: 31.5 SEC — SIGNIFICANT CHANGE UP (ref 27.5–36.3)
BLD GP AB SCN SERPL QL: NEGATIVE — SIGNIFICANT CHANGE UP
BUN SERPL-MCNC: 20 MG/DL — SIGNIFICANT CHANGE UP (ref 7–23)
CALCIUM SERPL-MCNC: 10.2 MG/DL — SIGNIFICANT CHANGE UP (ref 8.4–10.5)
CHLORIDE SERPL-SCNC: 103 MMOL/L — SIGNIFICANT CHANGE UP (ref 96–108)
CO2 SERPL-SCNC: 24 MMOL/L — SIGNIFICANT CHANGE UP (ref 22–31)
CREAT SERPL-MCNC: 0.99 MG/DL — SIGNIFICANT CHANGE UP (ref 0.5–1.3)
GLUCOSE SERPL-MCNC: 102 MG/DL — HIGH (ref 70–99)
HCT VFR BLD CALC: 40.2 % — SIGNIFICANT CHANGE UP (ref 34.5–45)
HGB BLD-MCNC: 13.5 G/DL — SIGNIFICANT CHANGE UP (ref 11.5–15.5)
INR BLD: 1.08 RATIO — SIGNIFICANT CHANGE UP (ref 0.88–1.16)
MCHC RBC-ENTMCNC: 27.4 PG — SIGNIFICANT CHANGE UP (ref 27–34)
MCHC RBC-ENTMCNC: 33.7 GM/DL — SIGNIFICANT CHANGE UP (ref 32–36)
MCV RBC AUTO: 81.3 FL — SIGNIFICANT CHANGE UP (ref 80–100)
PLATELET # BLD AUTO: 272 K/UL — SIGNIFICANT CHANGE UP (ref 150–400)
POTASSIUM SERPL-MCNC: 4.2 MMOL/L — SIGNIFICANT CHANGE UP (ref 3.5–5.3)
POTASSIUM SERPL-SCNC: 4.2 MMOL/L — SIGNIFICANT CHANGE UP (ref 3.5–5.3)
PROTHROM AB SERPL-ACNC: 12.3 SEC — SIGNIFICANT CHANGE UP (ref 10–12.9)
RBC # BLD: 4.95 M/UL — SIGNIFICANT CHANGE UP (ref 3.8–5.2)
RBC # FLD: 11.9 % — SIGNIFICANT CHANGE UP (ref 10.3–14.5)
RH IG SCN BLD-IMP: POSITIVE — SIGNIFICANT CHANGE UP
SODIUM SERPL-SCNC: 140 MMOL/L — SIGNIFICANT CHANGE UP (ref 135–145)
WBC # BLD: 9.1 K/UL — SIGNIFICANT CHANGE UP (ref 3.8–10.5)
WBC # FLD AUTO: 9.1 K/UL — SIGNIFICANT CHANGE UP (ref 3.8–10.5)

## 2018-12-07 PROCEDURE — 33208 INSRT HEART PM ATRIAL & VENT: CPT

## 2018-12-07 PROCEDURE — 93010 ELECTROCARDIOGRAM REPORT: CPT

## 2018-12-07 RX ORDER — ASPIRIN/CALCIUM CARB/MAGNESIUM 324 MG
81 TABLET ORAL DAILY
Qty: 0 | Refills: 0 | Status: DISCONTINUED | OUTPATIENT
Start: 2018-12-07 | End: 2018-12-08

## 2018-12-07 RX ORDER — ACETAMINOPHEN 500 MG
650 TABLET ORAL EVERY 6 HOURS
Qty: 0 | Refills: 0 | Status: DISCONTINUED | OUTPATIENT
Start: 2018-12-07 | End: 2018-12-08

## 2018-12-07 RX ORDER — METOPROLOL TARTRATE 50 MG
25 TABLET ORAL DAILY
Qty: 0 | Refills: 0 | Status: DISCONTINUED | OUTPATIENT
Start: 2018-12-07 | End: 2018-12-08

## 2018-12-07 RX ORDER — METOPROLOL TARTRATE 50 MG
1 TABLET ORAL
Qty: 0 | Refills: 0 | COMMUNITY
Start: 2018-12-07

## 2018-12-07 RX ORDER — CEFAZOLIN SODIUM 1 G
1000 VIAL (EA) INJECTION EVERY 8 HOURS
Qty: 0 | Refills: 0 | Status: COMPLETED | OUTPATIENT
Start: 2018-12-07 | End: 2018-12-08

## 2018-12-07 RX ORDER — SODIUM CHLORIDE 9 MG/ML
3 INJECTION INTRAMUSCULAR; INTRAVENOUS; SUBCUTANEOUS EVERY 8 HOURS
Qty: 0 | Refills: 0 | Status: DISCONTINUED | OUTPATIENT
Start: 2018-12-07 | End: 2018-12-08

## 2018-12-07 RX ORDER — ACETAMINOPHEN 500 MG
650 TABLET ORAL ONCE
Qty: 0 | Refills: 0 | Status: DISCONTINUED | OUTPATIENT
Start: 2018-12-07 | End: 2018-12-08

## 2018-12-07 RX ORDER — HYDRALAZINE HCL 50 MG
25 TABLET ORAL EVERY 12 HOURS
Qty: 0 | Refills: 0 | Status: DISCONTINUED | OUTPATIENT
Start: 2018-12-07 | End: 2018-12-08

## 2018-12-07 RX ORDER — MECLIZINE HCL 12.5 MG
12.5 TABLET ORAL THREE TIMES A DAY
Qty: 0 | Refills: 0 | Status: DISCONTINUED | OUTPATIENT
Start: 2018-12-07 | End: 2018-12-08

## 2018-12-07 RX ORDER — OXYCODONE AND ACETAMINOPHEN 5; 325 MG/1; MG/1
1 TABLET ORAL ONCE
Qty: 0 | Refills: 0 | Status: DISCONTINUED | OUTPATIENT
Start: 2018-12-07 | End: 2018-12-07

## 2018-12-07 RX ADMIN — OXYCODONE AND ACETAMINOPHEN 1 TABLET(S): 5; 325 TABLET ORAL at 22:22

## 2018-12-07 RX ADMIN — Medication 650 MILLIGRAM(S): at 17:50

## 2018-12-07 RX ADMIN — Medication 100 MILLIGRAM(S): at 20:58

## 2018-12-07 RX ADMIN — Medication 25 MILLIGRAM(S): at 17:50

## 2018-12-07 RX ADMIN — Medication 81 MILLIGRAM(S): at 17:50

## 2018-12-07 RX ADMIN — OXYCODONE AND ACETAMINOPHEN 1 TABLET(S): 5; 325 TABLET ORAL at 22:45

## 2018-12-07 RX ADMIN — Medication 650 MILLIGRAM(S): at 22:14

## 2018-12-07 RX ADMIN — Medication 25 MILLIGRAM(S): at 05:21

## 2018-12-07 RX ADMIN — SODIUM CHLORIDE 3 MILLILITER(S): 9 INJECTION INTRAMUSCULAR; INTRAVENOUS; SUBCUTANEOUS at 20:55

## 2018-12-07 NOTE — CONSULT NOTE ADULT - SUBJECTIVE AND OBJECTIVE BOX
Patient seen and evaluated at bedside in [facility] Bed 6, no family at bedside    No outpatient Cardiologist    Chief Complaint: "The chest pain" x days    HPI: 73F w/ PMHx of seasonal asthma, arthritis, vertigo who presented to an OSH w/ episodes of CP x days. The patient was woken up nightly with chest pain - pressure. She felt tired and had SOB prior to the episodes of CP. When the episode occurred during the day she needed to sit down.  She described the pain as a 7/10 pressure. The longest episode was 4 hours. She went to an Urgent Care on 12/3 for the pain and was sent to the OSH for an, "abnormal ECG." At the OSH she had a TTE and NST (result below). She had the same symptoms while admitted and was noted to have sinus cecilio to the 30s during the symptoms. She was transferred to [facility] for a PPM.     PMHx:   Obese  Vertigo  Arthritis  Seasonal allergies    PSHx: H/O: hysterectomy 21 years ago    Allergies: lisinopril (Angioedema)    Home Meds: Naproxen PRN, Meclizine 12.5 daily    FAMILY HISTORY: No pertinent family history in first degree relatives    Social History: Retired - used to work as a PCA at [employer], born in [place], moved to the  [place] age 27  Smoking History: Denied  Alcohol Use: Denied  Drug Use: Denied    REVIEW OF SYSTEMS:  CONSTITUTIONAL: No weakness, fevers or chills, + fatigue  EYES/ENT: No visual changes;  No dysphagia  NECK: No pain or stiffness  RESPIRATORY: No cough, wheezing, hemoptysis; + shortness of breath  CARDIOVASCULAR: + chest pain, no palpitations; No lower extremity edema  GASTROINTESTINAL: No abdominal or epigastric pain. No nausea, vomiting, or hematemesis; No diarrhea or constipation. No melena or hematochezia.  BACK: No back pain  GENITOURINARY: No dysuria, frequency or hematuria  NEUROLOGICAL: No numbness or weakness  SKIN: No itching, burning, rashes, or lesions   All other review of systems is negative unless indicated above.    Physical Exam:  T(F): 98.2 (-), Max: 98.3 (12-)  HR: 82 (-) (50 - 82)  BP: 132/89 (12-) (127/95 - 144/92)  RR: 16 (12-07)  SpO2: 96% on RA    GENERAL: No acute distress, well-developed, walking around the [facility], breathing comfortably on RA  HEAD:  Atraumatic, Normocephalic  ENT: EOMI, PERRLA, conjunctiva and sclera clear, Neck supple, No JVD, moist mucosa  CHEST/LUNG: Clear to auscultation bilaterally; No wheeze, equal breath sounds bilaterally   BACK: No spinal tenderness  HEART: Regular rate and rhythm; No murmurs, rubs, or gallops  ABDOMEN: Obese, Soft, Nontender, Nondistended; Bowel sounds present  EXTREMITIES:  No clubbing, cyanosis, or edema  PSYCH: Nl behavior, nl affect  NEUROLOGY: AAOx3, non-focal, cranial nerves intact  SKIN: Normal color, No rashes or lesions  LINES: b/l UE PIVs    Cardiovascular Diagnostic Testing:    Tele from OSH: sinus cecilio in the 30s    ECG: Personally reviewed: NSR @ 80, +LVH, nl axis, nl intervals, no ST/TW changes    Echo: < from: TTE Echo Doppler w/o Cont (18 @ 19:15) > PHYSICIAN INTERPRETATION: Left Ventricle: Normal left ventricular size and wall thicknesses, with normal systolic function. Left ventricular ejection fraction, by visual estimation, is 55 to 60%. Right Ventricle: Normal right ventricular size and function. Left Atrium: The left atrium is normal in size. Mildly enlarged left  atrium. Right Atrium: The right atrium is normal in size. Normal right atrial  size. Pericardium: There is no evidence of pericardial effusion. Mitral Valve: Structurally normal mitral valve, with normal leaflet excursion. There is mild mitral annular calcification. No evidence of mitral valve regurgitation is seen. Tricuspid Valve: Structurally normal tricuspid valve, with normal leaflet excursion. Trivial tricuspid regurgitation is visualized. Aortic Valve: Normal trileaflet aortic valve with normal opening. Peak Av  velocity is 1.81 m/s, mean transaortic gradient equals 5.0 mmHg, the  calculated aortic valve area equals 2.70 cm² by the continuity equation  consistent with normally opening aortic valve. The aortic valve mean  gradient is 5.0 mmHg consistent with normally opening aortic valve. No evidence of aortic valve regurgitation is seen. Pulmonic Valve: Structurally normal pulmonic valve, with normal leaflet  excursion. No indication of pulmonic valve regurgitation. Aorta: The aortic root and ascending aorta are structurally normal, with  no evidence of dilatation Pulmonary Artery: The main pulmonary artery is normal in size. Summary: 1. Left ventricular ejection fraction, by visual estimation, is 55 to 60%.  2. There is no left ventricular hypertrophy.  3. Mild mitral annular calcification.  4. Trace tricuspid regurgitation. < end of copied text >    Stress Testing: < from: NM Pharm Stress Test, Dual Isotope (18 @ 13:11) > IMPRESSION: Normal SPECT Myocardial Perfusion Imaging. Normal left ventricular function with an ejection fraction of 60% (Normal: 50% or greater). No regional wall motion abnormalities. No evidence of reversible or fixed perfusion defects. < end of copied text >    Imaging:    CTA < from: CT Angio Chest w/ IV Cont (18 @ 01:08) > IMPRESSION: No pulmonary embolism. < end of copied text >    Labs: Personally reviewed    PT/INR - ( 07 Dec 2018 12:36 )   PT: 12.3 sec;   INR: 1.08 ratio    PTT - ( 07 Dec 2018 12:36 )  PTT:31.5 sec    CARDIAC MARKERS ( 04 Dec 2018 02:49 )  x     / .040 ng/mL / x     / x     / x     / x      CARDIAC MARKERS ( 03 Dec 2018 23:30 )  x     / .037 ng/mL / x     / 173 U/L / x     / x        Total Cholesterol: 169  LDL: 107  HDL: 49  T    Hemoglobin A1C, Whole Blood: 6.5% ( @ 08:00)    Thyroid Stimulating Hormone, Serum: 1.660 uU/mL ( @ 23:30)    T/S is pending in the lab

## 2018-12-07 NOTE — DISCHARGE NOTE ADULT - PATIENT PORTAL LINK FT
You can access the [facility] Patient Portal, offered by [facility], by registering with the following website: [email or web address]

## 2018-12-07 NOTE — DISCHARGE NOTE ADULT - PLAN OF CARE
Your heart rate will be controlled. Continue with follow-up visits to your electrophysiology team and with your home remote device monitoring (if applicable). Continue your medications as prescribed. Your blood pressure will be controlled. Continue with your blood pressure medications; eat a heart healthy diet with low salt diet; exercise regularly (consult with your physician or cardiologist first); maintain a heart healthy weight; if you smoke - quit (A resource to help you stop smoking is the [facility] – phone number 005-912-1892.); include healthy ways to manage stress. Continue to follow with your primary care physician or cardiologist. Your LDL cholesterol will be less than 70mg/dL Continue with your cholesterol medications. Eat a heart healthy diet that is low in saturated fats and salt, and includes whole grains, fruits, vegetables and lean protein; exercise regularly (consult with your physician or cardiologist first); maintain a heart healthy weight. Continue to follow with your primary physician or cardiologist for treatment goals, continue medication, have liver function testing every 3 months as anti lipid medications can cause liver irritation. If you smoke - quit (A resource to help you stop smoking is the [facility] Tobacco Control – phone number [phone].).

## 2018-12-07 NOTE — H&P CARDIOLOGY - HISTORY OF PRESENT ILLNESS
72yo AA lady with a PMH asthma, arthritis and vertigo presented  to [facility] ED on 12/4 with palpitations associated with chest tightness, fatigue, dizziness and shortness of breath.  Has been having these events for over a month. Seen by her PCP Dr. Banegas, and was placed on Antivert for dizziness with no effect. Recently symptoms have been more persistent and increasing in severity. Denies syncope. No aggravating or relieving factors.  Denies any prior cardiac hx.    Trop neg x 3. RSR on tele with events of bradycardia in 30s.  2D echo: normal EF. Stress test: normal study  Transferred to [facility] today for PPM insertion. 74yo AA lady with a PMH asthma, arthritis and vertigo presented  to [facility] ED on 12/4 with palpitations associated with chest tightness, fatigue, dizziness and shortness of breath.  Has been having these events for over a month. Seen by her PCP Dr. Banegas, and was placed on Antivert for dizziness with no effect. Recently symptoms have been more persistent and increasing in severity. Denies syncope. No aggravating or relieving factors.  Denies any prior cardiac hx.    Trop neg x 3. RSR on tele with events of bradycardia in 30s.  2D echo: normal EF. Stress test: normal study  Transferred to [facility] today for PPM insertion.  SR 80s currently- asymptomatic.

## 2018-12-07 NOTE — CONSULT NOTE ADULT - ASSESSMENT
73F w/ PMHx of seasonal asthma, arthritis, vertigo who presented to an OSH w/ episodes of CP x days. Given that her symptoms were reproduced in the setting of sinus bradycardia to the 30s suspect sinus node disease explains her presentation (it also may explain her "vertigo" that she is on Meclizine for). TTE showed LVEF:55-60%, NST without evidence of ischemia.    Plan:    1. Symptomatic sinus bradycardia  -Keep NPO for PPM (Dual Chamber [employer]) with Dr. aMrc today, T/S in the lab, hold AVN blocking agents, hold heparin products (including post PPM), consent signed and in the front of the chart, d/w EP charge nurse  -Tomorrow morning she will need a PA and Lateral CXR  -Monitor on Tele    EP will continue to follow    SYD Catalan  Cardiology Fellow  (p): [phone]

## 2018-12-07 NOTE — CHART NOTE - NSCHARTNOTEFT_GEN_A_CORE
s/p PPM implant    tolerated procedure well  no pain no SOB    site is clean and dry  no bleeding, no hematoma  VSS     For PA/lat xray in AM   EKG in AM   Ancef as ordered  start metoprolol xl 25 mg PO daily as per Dr Marc   analgesia PRN

## 2018-12-07 NOTE — DISCHARGE NOTE ADULT - CARE PLAN
Principal Discharge DX:	Bradyarrhythmia  Goal:	Your heart rate will be controlled.  Assessment and plan of treatment:	Continue with follow-up visits to your electrophysiology team and with your home remote device monitoring (if applicable). Continue your medications as prescribed.  Secondary Diagnosis:	HTN (hypertension)  Goal:	Your blood pressure will be controlled.  Assessment and plan of treatment:	Continue with your blood pressure medications; eat a heart healthy diet with low salt diet; exercise regularly (consult with your physician or cardiologist first); maintain a heart healthy weight; if you smoke - quit (A resource to help you stop smoking is the [facility] [employer] [facility] – phone number [phone].); include healthy ways to manage stress. Continue to follow with your primary care physician or cardiologist.  Secondary Diagnosis:	HLD (hyperlipidemia)  Goal:	Your LDL cholesterol will be less than 70mg/dL  Assessment and plan of treatment:	Continue with your cholesterol medications. Eat a heart healthy diet that is low in saturated fats and salt, and includes whole grains, fruits, vegetables and lean protein; exercise regularly (consult with your physician or cardiologist first); maintain a heart healthy weight. Continue to follow with your primary physician or cardiologist for treatment goals, continue medication, have liver function testing every 3 months as anti lipid medications can cause liver irritation. If you smoke - quit (A resource to help you stop smoking is the [facility] [employer] [facility] – phone number [phone].).

## 2018-12-07 NOTE — DISCHARGE NOTE ADULT - CONDITION (STATED IN TERMS THAT PERMIT A SPECIFIC MEASURABLE COMPARISON WITH CONDITION ON ADMISSION):
at time of discharge patient denies chest pain, palpitations, SOB. Site WDL( dressing removed), no hematoma

## 2018-12-07 NOTE — DISCHARGE NOTE ADULT - MEDICATION SUMMARY - MEDICATIONS TO TAKE
I will START or STAY ON the medications listed below when I get home from the hospital:    aspirin 81 mg oral delayed release tablet  -- 1 tab(s) by mouth once a day HOSP  -- Indication: For Cardiac protection    meclizine 12.5 mg oral tablet  -- 1 tab(s) by mouth 3 times a day, As Needed  HOME  -- Indication: For Vertigo    metoprolol succinate 25 mg oral tablet, extended release  -- 1 tab(s) by mouth once a day  -- Indication: For High blood pressure     hydrALAZINE 25 mg oral tablet  -- 1 tab(s) by mouth every 12 hours HOSP  -- Indication: For High blood pressure

## 2018-12-07 NOTE — H&P CARDIOLOGY - PMH
Arthritis    Asthma, unspecified asthma severity, unspecified whether complicated, unspecified whether persistent    Bradyarrhythmia Arthritis    Asthma, unspecified asthma severity, unspecified whether complicated, unspecified whether persistent    Bradyarrhythmia    HLD (hyperlipidemia)    HTN (hypertension)    Obese    Vertigo

## 2018-12-07 NOTE — DISCHARGE NOTE ADULT - HOSPITAL COURSE
HPI: 73F w/ PMHx of seasonal asthma, arthritis, vertigo who presented to an OSH w/ episodes of CP x days. The patient was woken up nightly with chest pain - pressure. She felt tired and had SOB prior to the episodes of CP. When the episode occurred during the day she needed to sit down.  She described the pain as a 7/10 pressure. The longest episode was 4 hours. She went to an Urgent Care on 12/3 for the pain and was sent to the OSH for an, "abnormal ECG." At the OSH she had a TTE and NST (result below). She had the same symptoms while admitted and was noted to have sinus cecilio to the 30s during the symptoms. She was transferred to [facility] for a PPM. HPI: 73F w/ PMHx of seasonal asthma, arthritis, vertigo who presented to an OSH w/ episodes of CP x days. The patient was woken up nightly with chest pain - pressure. She felt tired and had SOB prior to the episodes of CP. When the episode occurred during the day she needed to sit down.  She described the pain as a 7/10 pressure. The longest episode was 4 hours. She went to an Urgent Care on 12/3 for the pain and was sent to the OSH for an, "abnormal ECG." At the OSH she had a TTE and NST (result below). She had the same symptoms while admitted and was noted to have sinus cecilio to the 30s during the symptoms. She was transferred to [facility] for a PPM. Pt is now s/p PPM implant via left anterior chest wall. Pt tolerated the procedure well, cardiac cath site benign. Post-procedure discharge instructions discussed and questions addressed HPI: 73F w/ PMHx of seasonal asthma, arthritis, vertigo who presented to an OSH w/ episodes of CP x days. The patient was woken up nightly with chest pain - pressure. She felt tired and had SOB prior to the episodes of CP. When the episode occurred during the day she needed to sit down.  She described the pain as a 7/10 pressure. The longest episode was 4 hours. She went to an Urgent Care on 12/3 for the pain and was sent to the OSH for an, "abnormal ECG." At the OSH she had a TTE and NST (result below). She had the same symptoms while admitted and was noted to have sinus cecilio to the 30s during the symptoms. She was transferred to [facility] for a PPM. Pt is now s/p PPM implant via left anterior chest wall. Pt tolerated the procedure well, cardiac cath site benign. Post-procedure discharge instructions discussed and questions addressed, case discussed with Dr. Juan patient stable for discharge

## 2018-12-07 NOTE — DISCHARGE NOTE ADULT - ADDITIONAL INSTRUCTIONS
Follow-up with your Cardiologist in 1-2 weeks Follow-up with Dr. Marc on 12/24 11:55    your pacemaker can send an electrical signal to your heart when it is necessary  call your doctor if you feel dizzy, lightheaded, shortness of breath, confused, more tired, faint  you will follow up with your pacemaker doctor regularly to check your pacemaker  your pacemaker battery usually will last 5 - 8 years  avoid certain electric or magnetic equipment  if you cannot walk through metal detector, ask for hand security search  most of the time you will not be able to have MRI follow directions  that you received about arm use and mobility, driving, sex & sling use  tell any health care provider that you have a pacemaker

## 2018-12-08 VITALS
SYSTOLIC BLOOD PRESSURE: 109 MMHG | HEART RATE: 72 BPM | TEMPERATURE: 98 F | OXYGEN SATURATION: 97 % | DIASTOLIC BLOOD PRESSURE: 72 MMHG | RESPIRATION RATE: 17 BRPM

## 2018-12-08 LAB
ANION GAP SERPL CALC-SCNC: 13 MMOL/L — SIGNIFICANT CHANGE UP (ref 5–17)
BUN SERPL-MCNC: 24 MG/DL — HIGH (ref 7–23)
CALCIUM SERPL-MCNC: 9.7 MG/DL — SIGNIFICANT CHANGE UP (ref 8.4–10.5)
CHLORIDE SERPL-SCNC: 105 MMOL/L — SIGNIFICANT CHANGE UP (ref 96–108)
CO2 SERPL-SCNC: 22 MMOL/L — SIGNIFICANT CHANGE UP (ref 22–31)
CREAT SERPL-MCNC: 1.11 MG/DL — SIGNIFICANT CHANGE UP (ref 0.5–1.3)
GLUCOSE SERPL-MCNC: 100 MG/DL — HIGH (ref 70–99)
HCT VFR BLD CALC: 38.5 % — SIGNIFICANT CHANGE UP (ref 34.5–45)
HGB BLD-MCNC: 13.2 G/DL — SIGNIFICANT CHANGE UP (ref 11.5–15.5)
MCHC RBC-ENTMCNC: 28 PG — SIGNIFICANT CHANGE UP (ref 27–34)
MCHC RBC-ENTMCNC: 34.3 GM/DL — SIGNIFICANT CHANGE UP (ref 32–36)
MCV RBC AUTO: 81.5 FL — SIGNIFICANT CHANGE UP (ref 80–100)
PLATELET # BLD AUTO: 252 K/UL — SIGNIFICANT CHANGE UP (ref 150–400)
POTASSIUM SERPL-MCNC: 4.3 MMOL/L — SIGNIFICANT CHANGE UP (ref 3.5–5.3)
POTASSIUM SERPL-SCNC: 4.3 MMOL/L — SIGNIFICANT CHANGE UP (ref 3.5–5.3)
RBC # BLD: 4.72 M/UL — SIGNIFICANT CHANGE UP (ref 3.8–5.2)
RBC # FLD: 12.1 % — SIGNIFICANT CHANGE UP (ref 10.3–14.5)
SODIUM SERPL-SCNC: 140 MMOL/L — SIGNIFICANT CHANGE UP (ref 135–145)
WBC # BLD: 7.7 K/UL — SIGNIFICANT CHANGE UP (ref 3.8–10.5)
WBC # FLD AUTO: 7.7 K/UL — SIGNIFICANT CHANGE UP (ref 3.8–10.5)

## 2018-12-08 PROCEDURE — 93005 ELECTROCARDIOGRAM TRACING: CPT

## 2018-12-08 PROCEDURE — 85730 THROMBOPLASTIN TIME PARTIAL: CPT

## 2018-12-08 PROCEDURE — 80048 BASIC METABOLIC PNL TOTAL CA: CPT

## 2018-12-08 PROCEDURE — 86900 BLOOD TYPING SEROLOGIC ABO: CPT

## 2018-12-08 PROCEDURE — 85610 PROTHROMBIN TIME: CPT

## 2018-12-08 PROCEDURE — 86850 RBC ANTIBODY SCREEN: CPT

## 2018-12-08 PROCEDURE — C1892: CPT

## 2018-12-08 PROCEDURE — 93010 ELECTROCARDIOGRAM REPORT: CPT

## 2018-12-08 PROCEDURE — 85027 COMPLETE CBC AUTOMATED: CPT

## 2018-12-08 PROCEDURE — 71046 X-RAY EXAM CHEST 2 VIEWS: CPT | Mod: 26

## 2018-12-08 PROCEDURE — C1785: CPT

## 2018-12-08 PROCEDURE — C1769: CPT

## 2018-12-08 PROCEDURE — 33208 INSRT HEART PM ATRIAL & VENT: CPT

## 2018-12-08 PROCEDURE — 86901 BLOOD TYPING SEROLOGIC RH(D): CPT

## 2018-12-08 PROCEDURE — 71046 X-RAY EXAM CHEST 2 VIEWS: CPT

## 2018-12-08 PROCEDURE — C1898: CPT

## 2018-12-08 RX ADMIN — Medication 81 MILLIGRAM(S): at 05:15

## 2018-12-08 RX ADMIN — Medication 25 MILLIGRAM(S): at 05:15

## 2018-12-08 RX ADMIN — Medication 25 MILLIGRAM(S): at 05:14

## 2018-12-08 RX ADMIN — Medication 100 MILLIGRAM(S): at 05:16

## 2018-12-08 RX ADMIN — SODIUM CHLORIDE 3 MILLILITER(S): 9 INJECTION INTRAMUSCULAR; INTRAVENOUS; SUBCUTANEOUS at 05:13

## 2018-12-08 NOTE — CONSULT NOTE ADULT - SUBJECTIVE AND OBJECTIVE BOX
HPI: 74yo F with a PMH asthma, arthritis and vertigo presented  to [facility] ED on 12/4 with palpitations associated with chest tightness, fatigue, dizziness and shortness of breath.  Has been having these events for over a month. Seen by her PCP Dr. Banegas, and was placed on Antivert for dizziness with no effect. Recently symptoms have been more persistent and increasing in severity. Denies syncope. No aggravating or relieving factors.  Denies any prior cardiac hx. Pt was transferred to [facility] for PPM placement, done yesterday. Currently feeling well, reports all symptoms resolved.      Allergies:  lisinopril (Angioedema)      PAST MEDICAL & SURGICAL HISTORY:  Obese  HLD (hyperlipidemia)  HTN (hypertension)  Vertigo  Bradyarrhythmia  Asthma, unspecified asthma severity, unspecified whether complicated, unspecified whether persistent  Arthritis  H/O: hysterectomy      FAMILY HISTORY:  No pertinent family history in first degree relatives      REVIEW OF SYSTEMS:  CONSTITUTIONAL: No fever, weight loss, or fatigue  EYES: No eye pain, visual disturbances, or discharge  NECK: No pain or stiffness  RESPIRATORY: No cough or wheezing, no shortness of breath  CARDIOVASCULAR: No chest pain, palpitations, dizziness, or leg swelling  GASTROINTESTINAL: No abdominal or epigastric pain. No nausea, vomiting, diarrhea or constipation  GENITOURINARY: No dysuria, urinary frequency or urgency, no hematuria  NEUROLOGICAL: No headaches, memory loss, loss of strength, numbness, or tremors  SKIN: No itching, burning, rashes, or lesions   MUSCULOSKELETAL: No joint pain or swelling; No muscle, back, or extremity pain    Medications:  MEDICATIONS  (STANDING):  aspirin enteric coated 81 milliGRAM(s) Oral daily  hydrALAZINE 25 milliGRAM(s) Oral every 12 hours  metoprolol succinate ER 25 milliGRAM(s) Oral daily  sodium chloride 0.9% lock flush 3 milliLiter(s) IV Push every 8 hours    MEDICATIONS  (PRN):  acetaminophen   Tablet .. 650 milliGRAM(s) Oral once PRN Moderate Pain (4 - 6)  acetaminophen   Tablet .. 650 milliGRAM(s) Oral every 6 hours PRN Moderate Pain (4 - 6)  meclizine 12.5 milliGRAM(s) Oral three times a day PRN Dizziness    	    PHYSICAL EXAM:  T(C): 36.8 (12-08-18 @ 09:57), Max: 36.8 (12-08-18 @ 09:57)  HR: 72 (12-08-18 @ 09:57) (60 - 94)  BP: 109/72 (12-08-18 @ 09:57) (109/65 - 153/93)  RR: 17 (12-08-18 @ 09:57) (16 - 18)  SpO2: 97% (12-08-18 @ 09:57) (96% - 100%)  Wt(kg): --  I&O's Summary    07 Dec 2018 07:01  -  08 Dec 2018 07:00  --------------------------------------------------------  IN: 360 mL / OUT: 0 mL / NET: 360 mL    08 Dec 2018 07:01  -  08 Dec 2018 12:19  --------------------------------------------------------  IN: 280 mL / OUT: 0 mL / NET: 280 mL        Appearance: Normal	  HEENT:   NCAT, PERRL, EOMI	  Lymphatic: No lymphadenopathy  Cardiovascular: Normal S1 S2, RRR  Respiratory: Lungs clear to auscultation BL  Psychiatry: A & O x 3, Mood & affect appropriate  Gastrointestinal:  Soft, Non-tender, + BS  Skin: No rashes, No ecchymoses, No cyanosis	  Neurologic: Non-focal  Extremities: Normal range of motion, No clubbing, cyanosis or edema    	  LABS:	 	    CARDIAC MARKERS:                                13.2   7.7   )-----------( 252      ( 08 Dec 2018 05:29 )             38.5     12-08    140  |  105  |  24<H>  ----------------------------<  100<H>  4.3   |  22  |  1.11    Ca    9.7      08 Dec 2018 05:29      proBNP:   Lipid Profile:   HgA1c:   TSH:

## 2018-12-08 NOTE — CONSULT NOTE ADULT - ASSESSMENT
74yo F with a PMH asthma, arthritis and vertigo, p/w symptomatic bradycardia, now s/p PPM:  1. Symptomatic bradycardia s/p PPM - care per EPS/Cardio, c/w tele, f/u CXR  2. HTN - BP at goal, c/w meds  3. Asthma - no wheezing, no SOB, monitor  4. Vertigo - c/w Meclezine PRN, although symptoms could have been caused by bradycardia and not vertigo  5. DVT prophylaxis

## 2018-12-10 DIAGNOSIS — R00.1 BRADYCARDIA, UNSPECIFIED: ICD-10-CM

## 2018-12-10 DIAGNOSIS — J45.909 UNSPECIFIED ASTHMA, UNCOMPLICATED: ICD-10-CM

## 2018-12-10 DIAGNOSIS — I10 ESSENTIAL (PRIMARY) HYPERTENSION: ICD-10-CM

## 2018-12-10 DIAGNOSIS — R07.9 CHEST PAIN, UNSPECIFIED: ICD-10-CM

## 2018-12-10 DIAGNOSIS — M19.90 UNSPECIFIED OSTEOARTHRITIS, UNSPECIFIED SITE: ICD-10-CM

## 2018-12-10 RX ORDER — METOPROLOL TARTRATE 50 MG
1 TABLET ORAL
Qty: 30 | Refills: 0
Start: 2018-12-10 | End: 2019-01-08

## 2018-12-10 RX ORDER — HYDRALAZINE HCL 50 MG
1 TABLET ORAL
Qty: 60 | Refills: 0
Start: 2018-12-10 | End: 2019-01-08

## 2018-12-11 PROBLEM — E66.9 OBESITY, UNSPECIFIED: Chronic | Status: ACTIVE | Noted: 2018-12-07

## 2018-12-11 PROBLEM — R42 DIZZINESS AND GIDDINESS: Chronic | Status: ACTIVE | Noted: 2018-12-07

## 2018-12-11 PROBLEM — E78.5 HYPERLIPIDEMIA, UNSPECIFIED: Chronic | Status: ACTIVE | Noted: 2018-12-07

## 2018-12-11 PROBLEM — I10 ESSENTIAL (PRIMARY) HYPERTENSION: Chronic | Status: ACTIVE | Noted: 2018-12-07

## 2018-12-11 PROBLEM — I49.8 OTHER SPECIFIED CARDIAC ARRHYTHMIAS: Chronic | Status: ACTIVE | Noted: 2018-12-07

## 2018-12-17 ENCOUNTER — NON-APPOINTMENT (OUTPATIENT)
Age: 73
End: 2018-12-17

## 2018-12-17 ENCOUNTER — APPOINTMENT (OUTPATIENT)
Dept: CARDIOLOGY | Facility: CLINIC | Age: 73
End: 2018-12-17
Payer: MEDICARE

## 2018-12-17 VITALS
OXYGEN SATURATION: 97 % | HEIGHT: 66 IN | SYSTOLIC BLOOD PRESSURE: 149 MMHG | HEART RATE: 60 BPM | DIASTOLIC BLOOD PRESSURE: 87 MMHG | TEMPERATURE: 98.3 F | BODY MASS INDEX: 33.59 KG/M2 | WEIGHT: 209 LBS

## 2018-12-17 DIAGNOSIS — I10 ESSENTIAL (PRIMARY) HYPERTENSION: ICD-10-CM

## 2018-12-17 DIAGNOSIS — Z82.49 FAMILY HISTORY OF ISCHEMIC HEART DISEASE AND OTHER DISEASES OF THE CIRCULATORY SYSTEM: ICD-10-CM

## 2018-12-17 PROCEDURE — 99215 OFFICE O/P EST HI 40 MIN: CPT

## 2018-12-17 PROCEDURE — 93000 ELECTROCARDIOGRAM COMPLETE: CPT

## 2018-12-19 ENCOUNTER — EMERGENCY (EMERGENCY)
Facility: HOSPITAL | Age: 73
LOS: 1 days | Discharge: HOME | End: 2018-12-19
Attending: EMERGENCY MEDICINE
Payer: COMMERCIAL

## 2018-12-19 VITALS
HEIGHT: 66 IN | HEART RATE: 69 BPM | DIASTOLIC BLOOD PRESSURE: 81 MMHG | TEMPERATURE: 98 F | WEIGHT: 209 LBS | SYSTOLIC BLOOD PRESSURE: 147 MMHG | OXYGEN SATURATION: 100 % | RESPIRATION RATE: 25 BRPM

## 2018-12-19 VITALS
OXYGEN SATURATION: 100 % | RESPIRATION RATE: 18 BRPM | SYSTOLIC BLOOD PRESSURE: 111 MMHG | HEART RATE: 85 BPM | TEMPERATURE: 98 F | DIASTOLIC BLOOD PRESSURE: 81 MMHG

## 2018-12-19 DIAGNOSIS — Z95.0 PRESENCE OF CARDIAC PACEMAKER: Chronic | ICD-10-CM

## 2018-12-19 DIAGNOSIS — Z90.710 ACQUIRED ABSENCE OF BOTH CERVIX AND UTERUS: Chronic | ICD-10-CM

## 2018-12-19 LAB
ALBUMIN SERPL ELPH-MCNC: 4.1 G/DL — SIGNIFICANT CHANGE UP (ref 3.3–5)
ALP SERPL-CCNC: 102 U/L — SIGNIFICANT CHANGE UP (ref 40–120)
ALT FLD-CCNC: 16 U/L — SIGNIFICANT CHANGE UP (ref 10–45)
ANION GAP SERPL CALC-SCNC: 14 MMOL/L — SIGNIFICANT CHANGE UP (ref 5–17)
APTT BLD: 31.9 SEC — SIGNIFICANT CHANGE UP (ref 27.5–36.3)
AST SERPL-CCNC: 22 U/L — SIGNIFICANT CHANGE UP (ref 10–40)
BASOPHILS # BLD AUTO: 0 K/UL — SIGNIFICANT CHANGE UP (ref 0–0.2)
BASOPHILS NFR BLD AUTO: 0.5 % — SIGNIFICANT CHANGE UP (ref 0–2)
BILIRUB SERPL-MCNC: 0.2 MG/DL — SIGNIFICANT CHANGE UP (ref 0.2–1.2)
BUN SERPL-MCNC: 20 MG/DL — SIGNIFICANT CHANGE UP (ref 7–23)
CALCIUM SERPL-MCNC: 9.9 MG/DL — SIGNIFICANT CHANGE UP (ref 8.4–10.5)
CHLORIDE SERPL-SCNC: 101 MMOL/L — SIGNIFICANT CHANGE UP (ref 96–108)
CK MB CFR SERPL CALC: 2.1 NG/ML — SIGNIFICANT CHANGE UP (ref 0–3.8)
CO2 SERPL-SCNC: 23 MMOL/L — SIGNIFICANT CHANGE UP (ref 22–31)
CREAT SERPL-MCNC: 0.98 MG/DL — SIGNIFICANT CHANGE UP (ref 0.5–1.3)
EOSINOPHIL # BLD AUTO: 0.1 K/UL — SIGNIFICANT CHANGE UP (ref 0–0.5)
EOSINOPHIL NFR BLD AUTO: 1.4 % — SIGNIFICANT CHANGE UP (ref 0–6)
GLUCOSE SERPL-MCNC: 117 MG/DL — HIGH (ref 70–99)
HCT VFR BLD CALC: 36.6 % — SIGNIFICANT CHANGE UP (ref 34.5–45)
HGB BLD-MCNC: 12.8 G/DL — SIGNIFICANT CHANGE UP (ref 11.5–15.5)
INR BLD: 1.08 RATIO — SIGNIFICANT CHANGE UP (ref 0.88–1.16)
LYMPHOCYTES # BLD AUTO: 2.1 K/UL — SIGNIFICANT CHANGE UP (ref 1–3.3)
LYMPHOCYTES # BLD AUTO: 25.8 % — SIGNIFICANT CHANGE UP (ref 13–44)
MCHC RBC-ENTMCNC: 28.4 PG — SIGNIFICANT CHANGE UP (ref 27–34)
MCHC RBC-ENTMCNC: 35 GM/DL — SIGNIFICANT CHANGE UP (ref 32–36)
MCV RBC AUTO: 81.2 FL — SIGNIFICANT CHANGE UP (ref 80–100)
MONOCYTES # BLD AUTO: 0.6 K/UL — SIGNIFICANT CHANGE UP (ref 0–0.9)
MONOCYTES NFR BLD AUTO: 6.9 % — SIGNIFICANT CHANGE UP (ref 2–14)
NEUTROPHILS # BLD AUTO: 5.4 K/UL — SIGNIFICANT CHANGE UP (ref 1.8–7.4)
NEUTROPHILS NFR BLD AUTO: 65.4 % — SIGNIFICANT CHANGE UP (ref 43–77)
NT-PROBNP SERPL-SCNC: 164 PG/ML — SIGNIFICANT CHANGE UP (ref 0–300)
PLATELET # BLD AUTO: 284 K/UL — SIGNIFICANT CHANGE UP (ref 150–400)
POTASSIUM SERPL-MCNC: 4.5 MMOL/L — SIGNIFICANT CHANGE UP (ref 3.5–5.3)
POTASSIUM SERPL-SCNC: 4.5 MMOL/L — SIGNIFICANT CHANGE UP (ref 3.5–5.3)
PROT SERPL-MCNC: 8.4 G/DL — HIGH (ref 6–8.3)
PROTHROM AB SERPL-ACNC: 12.3 SEC — SIGNIFICANT CHANGE UP (ref 10–12.9)
RBC # BLD: 4.51 M/UL — SIGNIFICANT CHANGE UP (ref 3.8–5.2)
RBC # FLD: 12.8 % — SIGNIFICANT CHANGE UP (ref 10.3–14.5)
SODIUM SERPL-SCNC: 138 MMOL/L — SIGNIFICANT CHANGE UP (ref 135–145)
TROPONIN T, HIGH SENSITIVITY RESULT: 21 NG/L — SIGNIFICANT CHANGE UP (ref 0–51)
TROPONIN T, HIGH SENSITIVITY RESULT: 21 NG/L — SIGNIFICANT CHANGE UP (ref 0–51)
WBC # BLD: 8.2 K/UL — SIGNIFICANT CHANGE UP (ref 3.8–10.5)
WBC # FLD AUTO: 8.2 K/UL — SIGNIFICANT CHANGE UP (ref 3.8–10.5)

## 2018-12-19 PROCEDURE — 82553 CREATINE MB FRACTION: CPT

## 2018-12-19 PROCEDURE — 93010 ELECTROCARDIOGRAM REPORT: CPT

## 2018-12-19 PROCEDURE — 93005 ELECTROCARDIOGRAM TRACING: CPT

## 2018-12-19 PROCEDURE — 84484 ASSAY OF TROPONIN QUANT: CPT

## 2018-12-19 PROCEDURE — 80053 COMPREHEN METABOLIC PANEL: CPT

## 2018-12-19 PROCEDURE — 93308 TTE F-UP OR LMTD: CPT | Mod: 26

## 2018-12-19 PROCEDURE — 93308 TTE F-UP OR LMTD: CPT

## 2018-12-19 PROCEDURE — 71045 X-RAY EXAM CHEST 1 VIEW: CPT

## 2018-12-19 PROCEDURE — 99284 EMERGENCY DEPT VISIT MOD MDM: CPT | Mod: 25

## 2018-12-19 PROCEDURE — 71045 X-RAY EXAM CHEST 1 VIEW: CPT | Mod: 26

## 2018-12-19 PROCEDURE — 85027 COMPLETE CBC AUTOMATED: CPT

## 2018-12-19 PROCEDURE — 83880 ASSAY OF NATRIURETIC PEPTIDE: CPT

## 2018-12-19 PROCEDURE — 85730 THROMBOPLASTIN TIME PARTIAL: CPT

## 2018-12-19 PROCEDURE — 99285 EMERGENCY DEPT VISIT HI MDM: CPT | Mod: GC

## 2018-12-19 PROCEDURE — 99285 EMERGENCY DEPT VISIT HI MDM: CPT | Mod: 25

## 2018-12-19 PROCEDURE — 85610 PROTHROMBIN TIME: CPT

## 2018-12-19 NOTE — ED PROVIDER NOTE - ATTENDING CONTRIBUTION TO CARE
****ATTENDING**** 74 yo female hx bradycardia s/p recent PPM discharged from the hospital. States s/p PPM she has felt SOB. states she has had symptoms intermittent last night had while sleeping and woke her up from her sleep. Denies any cough, uri, chest pain, palp. No fever or chills.   ON exam, Patient is awake,alert,oriented x 3. Patient is well appearing and in no acute distress. Patient's chest is clear to ausculation, +s1s2. PPM site w steri strips, non tender, no erythema. Abdomen is soft nd/nt +BS. Extremity with no swelling or calf tenderness.   Check labs, eval ppm and xray chest.

## 2018-12-19 NOTE — ED PROVIDER NOTE - NSFOLLOWUPINSTRUCTIONS_ED_ALL_ED_FT
1. Continue home medications as directed   2. Take your diuretic as directed by Dr. Tuttle until you are following up with him   3. Call him to schedule appointment for follow-up this week   4. Return to the ER for any new or worsening symptoms

## 2018-12-19 NOTE — REASON FOR VISIT
[Follow-Up - Clinic] : a clinic follow-up of [FreeTextEntry2] : Hypertension (Stable; Chronic). Hyperlipidemia (Stable; Chronic). S/P PPM (Stable; Chronic). Patient-Risk (High).

## 2018-12-19 NOTE — ED PROVIDER NOTE - NS ED ROS FT
Constitutional: No fever or chills  Eyes: No visual changes, eye pain or redness  HEENT: No throat pain, ear pain, nasal pain. No nose bleeding.  CV: No chest pain or lower extremity edema  Resp: +shortness of breath no cough  GI: No abd pain. No nausea or vomiting. No diarrhea. No constipation.   : No dysuria, hematuria.   MSK: No musculoskeletal pain  Skin: No rash  Neuro: No headache. No numbness or tingling. No weakness.

## 2018-12-19 NOTE — HISTORY OF PRESENT ILLNESS
[FreeTextEntry1] : 73 year old woman - history of seasonal asthma, arthritis, vertigo, HTN, HFpEF, S/P PPM 12/2018 - who presents for cardiovascular evaluation. \par -----------------\par 12/2018 \par Briefly, patient was admitted recently for CP. Nuclear MPI was negative. TTE was negative. Found to be bradycardic. Dr. Marc placed a PPM. \par Since PPM, still was MAURER limiting her. Previously diuretics caused cramping. \par But reports orthopnea. PND. "Fears lying flat."\par Subsequently seen in ED - likely anxiety but trial of 40 PO lasix x 2 days.\par S/P PPM - controlled on current treatment.\par HFpEF/HTN - Active\par Labs reviewed in EMR.\par -------------------\par /2018\par \par \par \par \par \par Meds\par ASA 81\par Metoprolol XL\par Albuterol PRN\par Hydral

## 2018-12-19 NOTE — DISCUSSION/SUMMARY
[FreeTextEntry1] : 73 year old woman - history of seasonal asthma, arthritis, vertigo, HTN, HFpEF, S/P PPM 12/2018 - who presents for cardiovascular evaluation. \par -----------------\par 2018 - Nl TTE\par 2018 - Nl MPI\par \par -----------------\par 1. SOB/HFpEF\par - Trial of Lasix 20mg QOD with Potassium\par - Pulmonology\par - Test for Sleep Apnea\par - Weight Loss\par - Subsequently seen in ED - likely anxiety but trial of 40 PO lasix x 2 days.\par \par 2. S/P PPM\par - Stable; Follow with Dr. Marc\par \par 3. HTN\par - Re-assess after Lasix trial \par \par \par \par \par \par \par \par \par \par \par \par \par \par \par \par \par Hypertension (Stable; Chronic). HFpEF (Active). S/P PPM (Stable; Chronic). Medication plan for these conditions noted above. \par Total Time Spent in face-to-face encounter was 45 minutes. >50% time spent in counseling and coordination of care and on addressing above medical conditions in assessment.\par All labs, imaging, consulting reports, and any relevant outside records including laboratory work personally reviewed in order to evaluate, manage, and coordinate care amongst providers.  Patient-Risk (High).\par Counseled on diet, exercise, cardiovascular risk reduction for > 15 minutes.\par \par

## 2018-12-19 NOTE — PHYSICAL EXAM
[General Appearance - Well Developed] : well developed [Normal Appearance] : normal appearance [Well Groomed] : well groomed [General Appearance - Well Nourished] : well nourished [No Deformities] : no deformities [General Appearance - In No Acute Distress] : no acute distress [Normal Conjunctiva] : the conjunctiva exhibited no abnormalities [Eyelids - No Xanthelasma] : the eyelids demonstrated no xanthelasmas [Normal Oral Mucosa] : normal oral mucosa [No Oral Pallor] : no oral pallor [No Oral Cyanosis] : no oral cyanosis [Normal Jugular Venous A Waves Present] : normal jugular venous A waves present [Normal Jugular Venous V Waves Present] : normal jugular venous V waves present [No Jugular Venous Francisco A Waves] : no jugular venous francisco A waves [Heart Rate And Rhythm] : heart rate and rhythm were normal [Heart Sounds] : normal S1 and S2 [Murmurs] : no murmurs present [Respiration, Rhythm And Depth] : normal respiratory rhythm and effort [Exaggerated Use Of Accessory Muscles For Inspiration] : no accessory muscle use [Auscultation Breath Sounds / Voice Sounds] : lungs were clear to auscultation bilaterally [Abdomen Soft] : soft [Abdomen Tenderness] : non-tender [Abdomen Mass (___ Cm)] : no abdominal mass palpated [Abnormal Walk] : normal gait [Gait - Sufficient For Exercise Testing] : the gait was sufficient for exercise testing [Nail Clubbing] : no clubbing of the fingernails [Cyanosis, Localized] : no localized cyanosis [Petechial Hemorrhages (___cm)] : no petechial hemorrhages [Skin Color & Pigmentation] : normal skin color and pigmentation [] : no rash [No Venous Stasis] : no venous stasis [Skin Lesions] : no skin lesions [No Skin Ulcers] : no skin ulcer [No Xanthoma] : no  xanthoma was observed [Oriented To Time, Place, And Person] : oriented to person, place, and time [Affect] : the affect was normal [Mood] : the mood was normal [No Anxiety] : not feeling anxious [FreeTextEntry1] : +PPM Site

## 2018-12-19 NOTE — ED PROVIDER NOTE - OBJECTIVE STATEMENT
74 y/o F pmhx asthma, htn, recent episode of bradycardia ppm placed x10 days ago presenting with worsening shortness of breath since procedure. States that she was feeling well right after the procedure, but then was having mild shortness of breath since that time. Saw Dr. Tuttle in office on Monday for follow-up and was advised to start water pill with potassium every other day, took once only on Monday. States that this morning she was brushing her hair and getting ready for the day and felt severely short of breath. Today was the worst. Denies any chest pain or chest pressure, cough, fever or chills.

## 2018-12-19 NOTE — ED ADULT NURSE REASSESSMENT NOTE - NS ED NURSE REASSESS COMMENT FT1
Report received from ROSALBA Girard. Pt a&oX3, resting comfortably in bed in no apparent distress. IV site remains intact, no redness or swelling noted to site. Pt on cardiac monitor. VSS. Pt denies CP, denies SOB at rest right now. Family  at bedside. awaiting cardiology consult. PT states that she had pacemaker placed on 12/6/18 for bradycardia and that for the last three days patient has been experiencing SOB and states the SOB woke her up from a sleep this morning. No work of breathing noted. Pt breathing spontaneously and unlabored, safety maintained.

## 2018-12-19 NOTE — ED ADULT NURSE NOTE - NSIMPLEMENTINTERV_GEN_ALL_ED
Implemented All Fall Risk Interventions:  Edgar to call system. Call bell, personal items and telephone within reach. Instruct patient to call for assistance. Room bathroom lighting operational. Non-slip footwear when patient is off stretcher. Physically safe environment: no spills, clutter or unnecessary equipment. Stretcher in lowest position, wheels locked, appropriate side rails in place. Provide visual cue, wrist band, yellow gown, etc. Monitor gait and stability. Monitor for mental status changes and reorient to person, place, and time. Review medications for side effects contributing to fall risk. Reinforce activity limits and safety measures with patient and family.

## 2018-12-19 NOTE — REVIEW OF SYSTEMS
[Shortness Of Breath] : shortness of breath [Negative] : Heme/Lymph [Chest Pain] : no chest pain [Palpitations] : no palpitations

## 2018-12-19 NOTE — PROCEDURE NOTE - ADDITIONAL PROCEDURE DETAILS
Late entry note from 12/19/18 1230   Indication: MAURER. Patient is s/p PPM for TBS/SSS on 12/7/18  -R wave noted to be smaller compared to time of implant, with normal pacing thresholds and lead impedance in both atrial and ventricular leads.   -Total V Paced 2.1% since implant  -No events for review   -Continue to monitor and patient will follow up in EP Clinic on Monday 12/24 for reinterrogation. Will discuss with EP Clinic NP's.   -Echo done without pericardial effusion.   -No changes made, continue to monitor. Cardiology team following.   -Above discussed with Dr. Marc    #[id number]

## 2018-12-19 NOTE — ED ADULT TRIAGE NOTE - CHIEF COMPLAINT QUOTE
I received a pacemaker on 12/6 and developed sob two days ago and today it became acutely worse. intermittent chest discomfort for two days

## 2018-12-19 NOTE — ED PROVIDER NOTE - PROGRESS NOTE DETAILS
cardiology fellow reports will speak with interrogation team to have pacemaker interrogated. -Hemalatha Ku PA-C no events on interrogation per EP. d/w cardiology fellow states he will staff case with Dr. Tuttle and call back. -Hemalatha Ku PA-C patient evaluated at bedside By Dr. Tuttle, reporting patient looks well and patient feels improved, ready to go home. Dr. Tuttle advised patient to take extra dose of her diuretic today and tomorrow and to follow-up with him in 2 days. Patient given strict return precautions and stable for d/c at this time. -MEENA MaryC

## 2018-12-19 NOTE — ED ADULT NURSE NOTE - OBJECTIVE STATEMENT
Patient   is  alert   and  oriented    x3.  Color is  good  and  skin warm  to  touch.  She  is  c/o  dyspnea  on exertion.   She  denies  chest pain  or  cough.  She  has  been  afebrile.   No  swelling  to  lower   extremities   noted.

## 2018-12-19 NOTE — ED ADULT NURSE NOTE - PMH
Arthritis    Asthma, unspecified asthma severity, unspecified whether complicated, unspecified whether persistent    Bradyarrhythmia    HLD (hyperlipidemia)    HTN (hypertension)    Obese    Vertigo

## 2018-12-19 NOTE — CONSULT NOTE ADULT - SUBJECTIVE AND OBJECTIVE BOX
Patient seen and evaluated at bedside    Chief Complaint:    HPI:  73F w/ PMHx of SSS s/p MDT dual chamber PPM on 12/7, seasonal asthma, arthritis, vertigo who recently presented on 12/3 from OSH after having chest pain and palpitations. that was thought to be due to bradycardia. During last admission, had pharm nuc that was normal, chest CT with normal findings, normal TSH, echo with EF of 55%    Patient notes that since PPM placement she has been having SOB. She states it occurred shortly after PPM placement and that she was told it's due to getting used to the pacemaker. She notes that prior to PPM was having fast heart rate with dizziness, which she has NOT had since pacemaker placed. There has been no chest pain.  Her SOB occurs at sleep (which was waking her up) and this morning when brushing her hair.      PMHx:   Obese  HLD (hyperlipidemia)  HTN (hypertension)  Vertigo  Bradyarrhythmia  Asthma, unspecified asthma severity, unspecified whether complicated, unspecified whether persistent  Arthritis      PSHx:   History of permanent cardiac pacemaker placement  H/O: hysterectomy  No significant past surgical history      Allergies:  lisinopril (Angioedema)      Home Meds:  ASA  Metop  Hydral      FAMILY HISTORY:  No pertinent family history in first degree relatives        REVIEW OF SYSTEMS:  CONSTITUTIONAL: No weakness, fevers or chills  EYES/ENT: No visual changes;  No dysphagia  NECK: No pain or stiffness  RESPIRATORY: No cough, wheezing, hemoptysis; No shortness of breath  CARDIOVASCULAR: No chest pain or palpitations; No lower extremity edema  GASTROINTESTINAL: No abdominal or epigastric pain. No nausea, vomiting, or hematemesis; No diarrhea or constipation. No melena or hematochezia.  BACK: No back pain  GENITOURINARY: No dysuria, frequency or hematuria  NEUROLOGICAL: No numbness or weakness  SKIN: No itching, burning, rashes, or lesions   All other review of systems is negative unless indicated above.    Physical Exam:  T(F): 98 (12-19), Max: 98.3 (12-19)  HR: 81 (12-19) (60 - 81)  BP: 120/83 (12-19) (120/83 - 147/81)  RR: 19 (12-19)  SpO2: 100% (12-19)  GENERAL: No acute distress, well-developed  HEAD:  Atraumatic, Normocephalic  ENT: EOMI, PERRLA, conjunctiva and sclera clear, Neck supple, No JVD, moist mucosa  CHEST/LUNG: Clear to auscultation bilaterally; No wheeze, equal breath sounds bilaterally   BACK: No spinal tenderness  HEART: Regular rate and rhythm; No murmurs, rubs, or gallops  ABDOMEN: Soft, Nontender, Nondistended; Bowel sounds present  EXTREMITIES:  No clubbing, cyanosis, or edema  PSYCH: Nl behavior, nl affect  NEUROLOGY: AAOx3, non-focal, cranial nerves intact  SKIN: Normal color, No rashes or lesions  LINES:    Cardiovascular Diagnostic Testing:    ECG: Personally reviewed:  Sinus 1st degree    Echo: Personally reviewed:  PHYSICIAN INTERPRETATION:  Left Ventricle: Normal left ventricular size and wall thicknesses, with   normal systolic function.  Left ventricular ejection fraction, by visual estimation, is 55 to 60%.  Right Ventricle: Normal right ventricular size and function.  Left Atrium: The left atrium is normal in size. Mildly enlarged left   atrium.  Right Atrium: The right atrium is normal in size.Normal right atrial   size.  Pericardium: There is no evidence of pericardial effusion.  Mitral Valve: Structurally normal mitral valve, with normal leaflet   excursion. There is mild mitral annular calcification. No evidence of   mitral valve regurgitation is seen.  Tricuspid Valve: Structurally normal tricuspid valve, with normal leaflet   excursion. Trivial tricuspid regurgitation is visualized.  Aortic Valve: Normal trileaflet aortic valve with normal opening. Peak Av   velocity is 1.81 m/s, mean transaortic gradient equals 5.0 mmHg, the   calculated aortic valve area equals 2.70 cm² by the continuity equation   consistent with normally opening aortic valve. The aortic valve mean   gradient is 5.0 mmHgconsistent with normally opening aortic valve. No   evidence of aortic valve regurgitation is seen.  Pulmonic Valve: Structurally normal pulmonic valve, with normal leaflet   excursion. No indication of pulmonic valve regurgitation.  Aorta: The aortic root and ascending aorta are structurally normal, with   no evidence of dilitation.  Pulmonary Artery: The main pulmonary artery is normal in size.       Summary:   1. Left ventricular ejection fraction, by visual estimation, is 55 to   60%.   2. There is no left ventricular hypertrophy.   3. Mild mitral annular calcification.   4. Trace tricuspid regurgitation.    [id number] Edgar Paul MD, FACC  Electronically signed on 12/5/2018 at 3:22:20 PM    Stress Testing:  FINDINGS: The technical quality of the resting and post-stress perfusion   images was good.  Review of resting and post-stress myocardial   scintigrams revealed normal left ventricular perfusion. There was no   evidence of reversible or fixed perfusion defects.    Gated wall motion study revealed normal left ventricular contractility.   There were no regional wall motion abnormalities or regions of decreased   wall thickening. There was no paradoxical septal motion.     Calculated left ventricular ejection fraction post-stress was 60%, based   on a left ventricular end diastolic volume of 101 mL and an end systolic   volume of 40 mL. Stroke volume was 61 mL.     IMPRESSION: Normal SPECT Myocardial Perfusion Imaging.     12/5/18    Cath:  Imaging:FINDINGS:   There is good opacification of pulmonary arterial tree. No filling defect   is present to suggest acute pulmonary embolus.    There is a 2.6 cm hypodense nodule in the left lobe of the thyroid gland.    Evaluation of the lung parenchyma demonstrates no suspicious pulmonary   nodule or mass. There is a tiny calcified granuloma in the left lower   lobe. There is no evidence of pneumonia. There is no pneumothorax.  There   is no pleural effusion. The tracheobronchial tree is patent.    There is no significant axillary, mediastinal or hilar adenopathy.    The heart is upper limits of normal in size. There is no pericardial   effusion. The thoracic aorta is normal in caliber without dissection.     Limited images of the upper abdomen demonstrate no acute abnormality.   There is a small hiatal hernia.    There are no acute osseous abnormalities.    IMPRESSION:  No pulmonary embolism.       CXR: Personally reviewed:    FINDINGS: The cardiac silhouette is normal in size. There is a   left-sided dual-lead pacemaker. There are no focal consolidations or   pleural effusions. The hilar and mediastinal structures appear   unremarkable. The osseous structures are intact.    IMPRESSION: Clear lungs.      Labs: Personally reviewed                        12.8   8.2   )-----------( 284      ( 19 Dec 2018 11:12 )             36.6     12-19    138  |  101  |  20  ----------------------------<  117<H>  4.5   |  23  |  0.98    Ca    9.9      19 Dec 2018 11:12    TPro  8.4<H>  /  Alb  4.1  /  TBili  0.2  /  DBili  x   /  AST  22  /  ALT  16  /  AlkPhos  102  12-19    PT/INR - ( 19 Dec 2018 11:12 )   PT: 12.3 sec;   INR: 1.08 ratio         PTT - ( 19 Dec 2018 11:12 )  PTT:31.9 sec    CARDIAC MARKERS ( 19 Dec 2018 11:12 )  21 ng/L / x     / x     / x     / x     / 2.1 ng/mL    Serum Pro-Brain Natriuretic Peptide: 164 pg/mL (12-19 @ 11:12)    TSH: 1.66 on 12/3 Patient seen and evaluated at bedside    Chief Complaint:    HPI:  73F w/ PMHx of SSS s/p MDT dual chamber PPM on 12/7, seasonal asthma, arthritis, vertigo who recently presented on 12/3 from OSH after having chest pain and palpitations. that was thought to be due to bradycardia. During last admission, had pharm nuc that was normal, chest CT with normal findings, normal TSH, echo with EF of 55%    Patient notes that since PPM placement she has been having SOB. She states it occurred shortly after PPM placement and that she was told it's due to getting used to the pacemaker. She notes that prior to PPM was having fast heart rate with dizziness, which she has NOT had since pacemaker placed. There has been no chest pain.  Her SOB occurs at sleep (which was waking her up) and this morning when brushing her hair.      PMHx:   Obese  HLD (hyperlipidemia)  HTN (hypertension)  Vertigo  Bradyarrhythmia  Asthma, unspecified asthma severity, unspecified whether complicated, unspecified whether persistent  Arthritis      PSHx:   History of permanent cardiac pacemaker placement  H/O: hysterectomy  No significant past surgical history      Allergies:  lisinopril (Angioedema)      Home Meds:  ASA  Metop  Hydral      FAMILY HISTORY:  Father - HTN        REVIEW OF SYSTEMS:  CONSTITUTIONAL: No weakness, fevers or chills  EYES/ENT: No visual changes;  No dysphagia  NECK: No pain or stiffness  RESPIRATORY: No cough, wheezing, hemoptysis; No shortness of breath  CARDIOVASCULAR: No chest pain or palpitations; No lower extremity edema  GASTROINTESTINAL: No abdominal or epigastric pain. No nausea, vomiting, or hematemesis; No diarrhea or constipation. No melena or hematochezia.  BACK: No back pain  GENITOURINARY: No dysuria, frequency or hematuria  NEUROLOGICAL: No numbness or weakness  SKIN: No itching, burning, rashes, or lesions   All other review of systems is negative unless indicated above.    Physical Exam:  T(F): 98 (12-19), Max: 98.3 (12-19)  HR: 81 (12-19) (60 - 81)  BP: 120/83 (12-19) (120/83 - 147/81)  RR: 19 (12-19)  SpO2: 100% (12-19)  GENERAL: No acute distress, well-developed  HEAD:  Atraumatic, Normocephalic  ENT: EOMI, PERRLA, conjunctiva and sclera clear, Neck supple, No JVD, moist mucosa  CHEST/LUNG: Clear to auscultation bilaterally; No wheeze, equal breath sounds bilaterally   BACK: No spinal tenderness  HEART: Regular rate and rhythm; No murmurs, rubs, or gallops  ABDOMEN: Soft, Nontender, Nondistended; Bowel sounds present  EXTREMITIES:  No clubbing, cyanosis, or edema  PSYCH: Nl behavior, nl affect  NEUROLOGY: AAOx3, non-focal, cranial nerves intact  SKIN: Normal color, No rashes or lesions  LINES:    Cardiovascular Diagnostic Testing:    ECG: Personally reviewed:  Sinus 1st degree    Echo: Personally reviewed:  PHYSICIAN INTERPRETATION:  Left Ventricle: Normal left ventricular size and wall thicknesses, with   normal systolic function.  Left ventricular ejection fraction, by visual estimation, is 55 to 60%.  Right Ventricle: Normal right ventricular size and function.  Left Atrium: The left atrium is normal in size. Mildly enlarged left   atrium.  Right Atrium: The right atrium is normal in size.Normal right atrial   size.  Pericardium: There is no evidence of pericardial effusion.  Mitral Valve: Structurally normal mitral valve, with normal leaflet   excursion. There is mild mitral annular calcification. No evidence of   mitral valve regurgitation is seen.  Tricuspid Valve: Structurally normal tricuspid valve, with normal leaflet   excursion. Trivial tricuspid regurgitation is visualized.  Aortic Valve: Normal trileaflet aortic valve with normal opening. Peak Av   velocity is 1.81 m/s, mean transaortic gradient equals 5.0 mmHg, the   calculated aortic valve area equals 2.70 cm² by the continuity equation   consistent with normally opening aortic valve. The aortic valve mean   gradient is 5.0 mmHgconsistent with normally opening aortic valve. No   evidence of aortic valve regurgitation is seen.  Pulmonic Valve: Structurally normal pulmonic valve, with normal leaflet   excursion. No indication of pulmonic valve regurgitation.  Aorta: The aortic root and ascending aorta are structurally normal, with   no evidence of dilitation.  Pulmonary Artery: The main pulmonary artery is normal in size.       Summary:   1. Left ventricular ejection fraction, by visual estimation, is 55 to   60%.   2. There is no left ventricular hypertrophy.   3. Mild mitral annular calcification.   4. Trace tricuspid regurgitation.    [id number] Edgar Paul MD, FACC  Electronically signed on 12/5/2018 at 3:22:20 PM    Stress Testing:  FINDINGS: The technical quality of the resting and post-stress perfusion   images was good.  Review of resting and post-stress myocardial   scintigrams revealed normal left ventricular perfusion. There was no   evidence of reversible or fixed perfusion defects.    Gated wall motion study revealed normal left ventricular contractility.   There were no regional wall motion abnormalities or regions of decreased   wall thickening. There was no paradoxical septal motion.     Calculated left ventricular ejection fraction post-stress was 60%, based   on a left ventricular end diastolic volume of 101 mL and an end systolic   volume of 40 mL. Stroke volume was 61 mL.     IMPRESSION: Normal SPECT Myocardial Perfusion Imaging.     12/5/18    Cath:  Imaging:FINDINGS:   There is good opacification of pulmonary arterial tree. No filling defect   is present to suggest acute pulmonary embolus.    There is a 2.6 cm hypodense nodule in the left lobe of the thyroid gland.    Evaluation of the lung parenchyma demonstrates no suspicious pulmonary   nodule or mass. There is a tiny calcified granuloma in the left lower   lobe. There is no evidence of pneumonia. There is no pneumothorax.  There   is no pleural effusion. The tracheobronchial tree is patent.    There is no significant axillary, mediastinal or hilar adenopathy.    The heart is upper limits of normal in size. There is no pericardial   effusion. The thoracic aorta is normal in caliber without dissection.     Limited images of the upper abdomen demonstrate no acute abnormality.   There is a small hiatal hernia.    There are no acute osseous abnormalities.    IMPRESSION:  No pulmonary embolism.       CXR: Personally reviewed:    FINDINGS: The cardiac silhouette is normal in size. There is a   left-sided dual-lead pacemaker. There are no focal consolidations or   pleural effusions. The hilar and mediastinal structures appear   unremarkable. The osseous structures are intact.    IMPRESSION: Clear lungs.      Labs: Personally reviewed 12/19/2018                        12.8   8.2   )-----------( 284      ( 19 Dec 2018 11:12 )             36.6     12-19    138  |  101  |  20  ----------------------------<  117<H>  4.5   |  23  |  0.98    Ca    9.9      19 Dec 2018 11:12    TPro  8.4<H>  /  Alb  4.1  /  TBili  0.2  /  DBili  x   /  AST  22  /  ALT  16  /  AlkPhos  102  12-19    PT/INR - ( 19 Dec 2018 11:12 )   PT: 12.3 sec;   INR: 1.08 ratio         PTT - ( 19 Dec 2018 11:12 )  PTT:31.9 sec    CARDIAC MARKERS ( 19 Dec 2018 11:12 )  21 ng/L / x     / x     / x     / x     / 2.1 ng/mL    Serum Pro-Brain Natriuretic Peptide: 164 pg/mL (12-19 @ 11:12)    TSH: 1.66 on 12/3

## 2018-12-24 ENCOUNTER — APPOINTMENT (OUTPATIENT)
Dept: ELECTROPHYSIOLOGY | Facility: CLINIC | Age: 73
End: 2018-12-24

## 2018-12-26 ENCOUNTER — EMERGENCY (EMERGENCY)
Facility: HOSPITAL | Age: 73
LOS: 0 days | Discharge: HOME | End: 2018-12-26
Attending: EMERGENCY MEDICINE
Payer: COMMERCIAL

## 2018-12-26 VITALS
HEART RATE: 61 BPM | DIASTOLIC BLOOD PRESSURE: 80 MMHG | HEIGHT: 66 IN | OXYGEN SATURATION: 88 % | TEMPERATURE: 98 F | WEIGHT: 209 LBS | SYSTOLIC BLOOD PRESSURE: 161 MMHG | RESPIRATION RATE: 28 BRPM

## 2018-12-26 VITALS
OXYGEN SATURATION: 98 % | HEART RATE: 75 BPM | RESPIRATION RATE: 20 BRPM | SYSTOLIC BLOOD PRESSURE: 120 MMHG | DIASTOLIC BLOOD PRESSURE: 64 MMHG | TEMPERATURE: 98 F

## 2018-12-26 DIAGNOSIS — I10 ESSENTIAL (PRIMARY) HYPERTENSION: ICD-10-CM

## 2018-12-26 DIAGNOSIS — M19.90 UNSPECIFIED OSTEOARTHRITIS, UNSPECIFIED SITE: ICD-10-CM

## 2018-12-26 DIAGNOSIS — J45.901 UNSPECIFIED ASTHMA WITH (ACUTE) EXACERBATION: ICD-10-CM

## 2018-12-26 DIAGNOSIS — Z95.0 PRESENCE OF CARDIAC PACEMAKER: Chronic | ICD-10-CM

## 2018-12-26 DIAGNOSIS — Z88.9 ALLERGY STATUS TO UNSPECIFIED DRUGS, MEDICAMENTS AND BIOLOGICAL SUBSTANCES: ICD-10-CM

## 2018-12-26 DIAGNOSIS — E78.5 HYPERLIPIDEMIA, UNSPECIFIED: ICD-10-CM

## 2018-12-26 DIAGNOSIS — Z90.710 ACQUIRED ABSENCE OF BOTH CERVIX AND UTERUS: ICD-10-CM

## 2018-12-26 DIAGNOSIS — R06.2 WHEEZING: ICD-10-CM

## 2018-12-26 DIAGNOSIS — Z90.710 ACQUIRED ABSENCE OF BOTH CERVIX AND UTERUS: Chronic | ICD-10-CM

## 2018-12-26 DIAGNOSIS — E66.9 OBESITY, UNSPECIFIED: ICD-10-CM

## 2018-12-26 DIAGNOSIS — Z79.82 LONG TERM (CURRENT) USE OF ASPIRIN: ICD-10-CM

## 2018-12-26 DIAGNOSIS — I49.8 OTHER SPECIFIED CARDIAC ARRHYTHMIAS: ICD-10-CM

## 2018-12-26 DIAGNOSIS — Z95.0 PRESENCE OF CARDIAC PACEMAKER: ICD-10-CM

## 2018-12-26 DIAGNOSIS — R06.02 SHORTNESS OF BREATH: ICD-10-CM

## 2018-12-26 LAB
ALBUMIN SERPL ELPH-MCNC: 3.5 G/DL — SIGNIFICANT CHANGE UP (ref 3.3–5)
ALP SERPL-CCNC: 102 U/L — SIGNIFICANT CHANGE UP (ref 40–120)
ALT FLD-CCNC: 23 U/L — SIGNIFICANT CHANGE UP (ref 12–78)
ANION GAP SERPL CALC-SCNC: 7 MMOL/L — SIGNIFICANT CHANGE UP (ref 5–17)
APTT BLD: 31.8 SEC — SIGNIFICANT CHANGE UP (ref 28.5–37)
AST SERPL-CCNC: 21 U/L — SIGNIFICANT CHANGE UP (ref 15–37)
BASOPHILS # BLD AUTO: 0.02 K/UL — SIGNIFICANT CHANGE UP (ref 0–0.2)
BASOPHILS NFR BLD AUTO: 0.2 % — SIGNIFICANT CHANGE UP (ref 0–2)
BILIRUB SERPL-MCNC: 0.2 MG/DL — SIGNIFICANT CHANGE UP (ref 0.2–1.2)
BUN SERPL-MCNC: 20 MG/DL — SIGNIFICANT CHANGE UP (ref 7–23)
CALCIUM SERPL-MCNC: 8.7 MG/DL — SIGNIFICANT CHANGE UP (ref 8.5–10.1)
CHLORIDE SERPL-SCNC: 108 MMOL/L — SIGNIFICANT CHANGE UP (ref 96–108)
CK MB BLD-MCNC: 0.8 % — SIGNIFICANT CHANGE UP (ref 0–3.5)
CK MB CFR SERPL CALC: 1.3 NG/ML — SIGNIFICANT CHANGE UP (ref 0.5–3.6)
CK SERPL-CCNC: 155 U/L — SIGNIFICANT CHANGE UP (ref 26–192)
CO2 SERPL-SCNC: 26 MMOL/L — SIGNIFICANT CHANGE UP (ref 22–31)
CREAT SERPL-MCNC: 1.22 MG/DL — SIGNIFICANT CHANGE UP (ref 0.5–1.3)
EOSINOPHIL # BLD AUTO: 0.2 K/UL — SIGNIFICANT CHANGE UP (ref 0–0.5)
EOSINOPHIL NFR BLD AUTO: 2.4 % — SIGNIFICANT CHANGE UP (ref 0–6)
GLUCOSE SERPL-MCNC: 96 MG/DL — SIGNIFICANT CHANGE UP (ref 70–99)
HCT VFR BLD CALC: 37.2 % — SIGNIFICANT CHANGE UP (ref 34.5–45)
HGB BLD-MCNC: 11.9 G/DL — SIGNIFICANT CHANGE UP (ref 11.5–15.5)
IMM GRANULOCYTES NFR BLD AUTO: 0.4 % — SIGNIFICANT CHANGE UP (ref 0–1.5)
INR BLD: 1.09 RATIO — SIGNIFICANT CHANGE UP (ref 0.88–1.16)
LYMPHOCYTES # BLD AUTO: 2.65 K/UL — SIGNIFICANT CHANGE UP (ref 1–3.3)
LYMPHOCYTES # BLD AUTO: 32.4 % — SIGNIFICANT CHANGE UP (ref 13–44)
MCHC RBC-ENTMCNC: 26.5 PG — LOW (ref 27–34)
MCHC RBC-ENTMCNC: 32 GM/DL — SIGNIFICANT CHANGE UP (ref 32–36)
MCV RBC AUTO: 82.9 FL — SIGNIFICANT CHANGE UP (ref 80–100)
MONOCYTES # BLD AUTO: 0.58 K/UL — SIGNIFICANT CHANGE UP (ref 0–0.9)
MONOCYTES NFR BLD AUTO: 7.1 % — SIGNIFICANT CHANGE UP (ref 2–14)
NEUTROPHILS # BLD AUTO: 4.69 K/UL — SIGNIFICANT CHANGE UP (ref 1.8–7.4)
NEUTROPHILS NFR BLD AUTO: 57.5 % — SIGNIFICANT CHANGE UP (ref 43–77)
NRBC # BLD: 0 /100 WBCS — SIGNIFICANT CHANGE UP (ref 0–0)
NT-PROBNP SERPL-SCNC: 119 PG/ML — SIGNIFICANT CHANGE UP (ref 0–125)
PLATELET # BLD AUTO: 292 K/UL — SIGNIFICANT CHANGE UP (ref 150–400)
POTASSIUM SERPL-MCNC: 3.9 MMOL/L — SIGNIFICANT CHANGE UP (ref 3.5–5.3)
POTASSIUM SERPL-SCNC: 3.9 MMOL/L — SIGNIFICANT CHANGE UP (ref 3.5–5.3)
PROT SERPL-MCNC: 7.9 GM/DL — SIGNIFICANT CHANGE UP (ref 6–8.3)
PROTHROM AB SERPL-ACNC: 12.2 SEC — SIGNIFICANT CHANGE UP (ref 10–12.9)
RBC # BLD: 4.49 M/UL — SIGNIFICANT CHANGE UP (ref 3.8–5.2)
RBC # FLD: 13.3 % — SIGNIFICANT CHANGE UP (ref 10.3–14.5)
SODIUM SERPL-SCNC: 141 MMOL/L — SIGNIFICANT CHANGE UP (ref 135–145)
TROPONIN I SERPL-MCNC: 0.02 NG/ML — SIGNIFICANT CHANGE UP (ref 0.01–0.04)
WBC # BLD: 8.17 K/UL — SIGNIFICANT CHANGE UP (ref 3.8–10.5)
WBC # FLD AUTO: 8.17 K/UL — SIGNIFICANT CHANGE UP (ref 3.8–10.5)

## 2018-12-26 PROCEDURE — 99285 EMERGENCY DEPT VISIT HI MDM: CPT

## 2018-12-26 PROCEDURE — 71045 X-RAY EXAM CHEST 1 VIEW: CPT | Mod: 26

## 2018-12-26 PROCEDURE — 71275 CT ANGIOGRAPHY CHEST: CPT | Mod: 26

## 2018-12-26 RX ORDER — IPRATROPIUM/ALBUTEROL SULFATE 18-103MCG
3 AEROSOL WITH ADAPTER (GRAM) INHALATION ONCE
Qty: 0 | Refills: 0 | Status: COMPLETED | OUTPATIENT
Start: 2018-12-26 | End: 2018-12-26

## 2018-12-26 RX ORDER — FUROSEMIDE 40 MG
40 TABLET ORAL ONCE
Qty: 0 | Refills: 0 | Status: COMPLETED | OUTPATIENT
Start: 2018-12-26 | End: 2018-12-26

## 2018-12-26 RX ORDER — ALBUTEROL 90 UG/1
2 AEROSOL, METERED ORAL
Qty: 1 | Refills: 0
Start: 2018-12-26 | End: 2019-01-24

## 2018-12-26 RX ADMIN — Medication 125 MILLIGRAM(S): at 12:37

## 2018-12-26 RX ADMIN — Medication 3 MILLILITER(S): at 11:35

## 2018-12-26 RX ADMIN — Medication 40 MILLIGRAM(S): at 11:40

## 2018-12-26 NOTE — ED ADULT NURSE REASSESSMENT NOTE - NS ED NURSE REASSESS COMMENT FT1
Pt reports improvement in respiratory status. clear lung fields noted upon auscultation, s/p breathing treatment administration

## 2018-12-26 NOTE — ED ADULT NURSE NOTE - NSIMPLEMENTINTERV_GEN_ALL_ED
Implemented All Universal Safety Interventions:  [place] to call system. Call bell, personal items and telephone within reach. Instruct patient to call for assistance. Room bathroom lighting operational. Non-slip footwear when patient is off stretcher. Physically safe environment: no spills, clutter or unnecessary equipment. Stretcher in lowest position, wheels locked, appropriate side rails in place.

## 2018-12-26 NOTE — ED ADULT NURSE NOTE - OBJECTIVE STATEMENT
Pt c/o sob since 12/7/18 after PPM placement worsened today. Pt worsening sob on exertion, placed on 2L NC upon arrival to ED. Pt placed on CM, EKG done. Pt denies chest pain with symptoms

## 2018-12-26 NOTE — ED PROVIDER NOTE - CONSTITUTIONAL, MLM
normal... Well appearing, well nourished, awake, alert, oriented to person, place, time/situation and in mild respiratory distress

## 2018-12-26 NOTE — ED PROVIDER NOTE - MEDICAL DECISION MAKING DETAILS
pt wheezing improved, feeling SOB no longer present - will dc with follow up PMD or pulmonology - Dr. Benites and given albuterol/prednisone.

## 2018-12-26 NOTE — ED PROVIDER NOTE - OBJECTIVE STATEMENT
73 year old female with PMH of HTN, HLD, PPM, asthma presenting to ED due to wheezing and SOB - states present even after having PPM placed - was told she had no fluid in lungs and sent home - as pt still feeling some SOB came in for evaluation.

## 2018-12-26 NOTE — ED ADULT NURSE REASSESSMENT NOTE - NS ED NURSE REASSESS COMMENT FT1
Pt discharge with follow up care instructions, verbalize understanding. No apparent respiratory distress noted upon leaving ED

## 2018-12-28 ENCOUNTER — EMERGENCY (EMERGENCY)
Facility: HOSPITAL | Age: 73
LOS: 0 days | Discharge: HOME | End: 2018-12-28
Attending: EMERGENCY MEDICINE
Payer: COMMERCIAL

## 2018-12-28 VITALS
OXYGEN SATURATION: 97 % | HEIGHT: 66 IN | SYSTOLIC BLOOD PRESSURE: 122 MMHG | TEMPERATURE: 98 F | HEART RATE: 60 BPM | RESPIRATION RATE: 20 BRPM | WEIGHT: 209 LBS | DIASTOLIC BLOOD PRESSURE: 64 MMHG

## 2018-12-28 VITALS
DIASTOLIC BLOOD PRESSURE: 64 MMHG | TEMPERATURE: 98 F | OXYGEN SATURATION: 97 % | HEART RATE: 60 BPM | SYSTOLIC BLOOD PRESSURE: 124 MMHG | RESPIRATION RATE: 16 BRPM

## 2018-12-28 DIAGNOSIS — I10 ESSENTIAL (PRIMARY) HYPERTENSION: ICD-10-CM

## 2018-12-28 DIAGNOSIS — Z95.0 PRESENCE OF CARDIAC PACEMAKER: Chronic | ICD-10-CM

## 2018-12-28 DIAGNOSIS — R06.02 SHORTNESS OF BREATH: ICD-10-CM

## 2018-12-28 DIAGNOSIS — Z90.710 ACQUIRED ABSENCE OF BOTH CERVIX AND UTERUS: Chronic | ICD-10-CM

## 2018-12-28 LAB
ALBUMIN SERPL ELPH-MCNC: 3.4 G/DL — SIGNIFICANT CHANGE UP (ref 3.3–5)
ALP SERPL-CCNC: 106 U/L — SIGNIFICANT CHANGE UP (ref 40–120)
ALT FLD-CCNC: 25 U/L — SIGNIFICANT CHANGE UP (ref 12–78)
ANION GAP SERPL CALC-SCNC: 8 MMOL/L — SIGNIFICANT CHANGE UP (ref 5–17)
APTT BLD: 28.9 SEC — SIGNIFICANT CHANGE UP (ref 27.5–36.3)
AST SERPL-CCNC: 18 U/L — SIGNIFICANT CHANGE UP (ref 15–37)
BILIRUB SERPL-MCNC: 0.2 MG/DL — SIGNIFICANT CHANGE UP (ref 0.2–1.2)
BUN SERPL-MCNC: 37 MG/DL — HIGH (ref 7–23)
CALCIUM SERPL-MCNC: 9.1 MG/DL — SIGNIFICANT CHANGE UP (ref 8.5–10.1)
CHLORIDE SERPL-SCNC: 108 MMOL/L — SIGNIFICANT CHANGE UP (ref 96–108)
CO2 SERPL-SCNC: 26 MMOL/L — SIGNIFICANT CHANGE UP (ref 22–31)
CREAT SERPL-MCNC: 1.37 MG/DL — HIGH (ref 0.5–1.3)
GLUCOSE SERPL-MCNC: 119 MG/DL — HIGH (ref 70–99)
HCT VFR BLD CALC: 37.2 % — SIGNIFICANT CHANGE UP (ref 34.5–45)
HGB BLD-MCNC: 12 G/DL — SIGNIFICANT CHANGE UP (ref 11.5–15.5)
INR BLD: 0.98 RATIO — SIGNIFICANT CHANGE UP (ref 0.88–1.16)
MCHC RBC-ENTMCNC: 26.4 PG — LOW (ref 27–34)
MCHC RBC-ENTMCNC: 32.3 GM/DL — SIGNIFICANT CHANGE UP (ref 32–36)
MCV RBC AUTO: 81.8 FL — SIGNIFICANT CHANGE UP (ref 80–100)
NRBC # BLD: 0 /100 WBCS — SIGNIFICANT CHANGE UP (ref 0–0)
NT-PROBNP SERPL-SCNC: 118 PG/ML — SIGNIFICANT CHANGE UP (ref 0–125)
PLATELET # BLD AUTO: 317 K/UL — SIGNIFICANT CHANGE UP (ref 150–400)
POTASSIUM SERPL-MCNC: 4.1 MMOL/L — SIGNIFICANT CHANGE UP (ref 3.5–5.3)
POTASSIUM SERPL-SCNC: 4.1 MMOL/L — SIGNIFICANT CHANGE UP (ref 3.5–5.3)
PROT SERPL-MCNC: 8.4 GM/DL — HIGH (ref 6–8.3)
PROTHROM AB SERPL-ACNC: 11 SEC — SIGNIFICANT CHANGE UP (ref 10–12.9)
RBC # BLD: 4.55 M/UL — SIGNIFICANT CHANGE UP (ref 3.8–5.2)
RBC # FLD: 13.3 % — SIGNIFICANT CHANGE UP (ref 10.3–14.5)
SODIUM SERPL-SCNC: 142 MMOL/L — SIGNIFICANT CHANGE UP (ref 135–145)
TROPONIN I SERPL-MCNC: 0.02 NG/ML — SIGNIFICANT CHANGE UP (ref 0.01–0.04)
WBC # BLD: 8.56 K/UL — SIGNIFICANT CHANGE UP (ref 3.8–10.5)
WBC # FLD AUTO: 8.56 K/UL — SIGNIFICANT CHANGE UP (ref 3.8–10.5)

## 2018-12-28 PROCEDURE — 99285 EMERGENCY DEPT VISIT HI MDM: CPT | Mod: 25

## 2018-12-28 PROCEDURE — 71045 X-RAY EXAM CHEST 1 VIEW: CPT | Mod: 26

## 2018-12-28 RX ORDER — ALPRAZOLAM 0.25 MG
0.25 TABLET ORAL ONCE
Qty: 0 | Refills: 0 | Status: DISCONTINUED | OUTPATIENT
Start: 2018-12-28 | End: 2018-12-28

## 2018-12-28 RX ADMIN — Medication 0.25 MILLIGRAM(S): at 06:15

## 2018-12-28 NOTE — ED PROVIDER NOTE - CARE PLAN
Principal Discharge DX:	Shortness of breath Principal Discharge DX:	Shortness of breath  Goal:	anxiety related to recent pacemaker. anxiolytics.  Assessment and plan of treatment:	follow up with Dr Hoff at [facility] on monday

## 2018-12-28 NOTE — ED PROVIDER NOTE - PLAN OF CARE
anxiety related to recent pacemaker. anxiolytics. follow up with Dr Hoff at [facility] on monday

## 2018-12-28 NOTE — ED PROVIDER NOTE - CARE PROVIDER_API CALL
Dr Hoff, [facility] center,   PCP On Monday.  Phone: (   )    -  Fax: (   )    -

## 2018-12-28 NOTE — ED ADULT NURSE REASSESSMENT NOTE - NS ED NURSE REASSESS COMMENT FT1
Endorsed by off going nurse pt is alert and states she feels better. Pt on monitor Sinus Rhythm with pace maker spikes. IV access patent to right lower arm.

## 2018-12-28 NOTE — ED PROVIDER NOTE - OBJECTIVE STATEMENT
72yo female with pmh HTN, Hl, recent PPM placement on 12/8 for sinus cecilio to 80s presents with sob. Per chart, pt had sob prior to PPM placement and after PPM, pt returned to ER twice for inc SOB. Pt started on diuretics. Pt seen in ER with neg CTA and treated for asthma exacerbation. Pt inc sob today and unable to catch her breath. denies cp. denies cough, palpitations, dizziness.     ROS: No fever/chills. No photophobia/eye pain/changes in vision, No ear pain/sore throat/dysphagia, No chest pain/palpitations. + SOB, no cough/stridor. No abdominal pain, N/V/D, no black/bloody bm. No dysuria/frequency/discharge, No headache. No Dizziness.  No rash.  No numbness/tingling/weakness.

## 2018-12-28 NOTE — ED PROVIDER NOTE - PHYSICAL EXAMINATION
Gen: Alert, Pt feels sob, though sat 100%, normal paced rhythm,   Head: NC, AT, PERRL, EOMI, normal lids/conjunctiva   ENT: Bilateral TM WNL, normal hearing, patent oropharynx without erythema/exudate, uvula midline  Neck: supple, no tenderness/meningismus/JVD   Pulm: Bilateral clear BS, normal resp effort, no wheeze/stridor/retractions  CV: RRR, no M/R/G, +dist pulses   Abd: soft, NT/ND, +BS, no guarding/rebound tenderness  Mskel: no edema/erythema/cyanosis   Skin: no rash   Neuro: AAOx3, no sensory/motor deficits, CN 2-12 intact

## 2018-12-28 NOTE — ED ADULT NURSE REASSESSMENT NOTE - NS ED NURSE REASSESS COMMENT FT1
continues to feel better. She denies chest pains, SOB or palpitations. Discharge home picked  up by daughter. Instructions reviewed with patient.

## 2018-12-28 NOTE — ED PROVIDER NOTE - PROVIDER TOKENS
FREE:[LAST:[Dr Hoff, [facility]],PHONE:[(   )    -],FAX:[(   )    -],ADDRESS:[PCP On Monday.]]

## 2018-12-28 NOTE — ED ADULT TRIAGE NOTE - CHIEF COMPLAINT QUOTE
pt c/o difficulty breathing and MAURER since 3am.  pt seen in ED 2 days ago for SOB  (PSH - PM 12/7/18)

## 2018-12-28 NOTE — ED PROVIDER NOTE - MEDICAL DECISION MAKING DETAILS
Pt with stable VSS, 100% O2. Lab values do not require emergent intervention. Will await repeat CE. Patient signed out to incoming physician.  All decisions regarding the progression of care will be made at their discretion.

## 2019-01-31 ENCOUNTER — RX RENEWAL (OUTPATIENT)
Age: 74
End: 2019-01-31

## 2019-02-01 ENCOUNTER — INPATIENT (INPATIENT)
Facility: HOSPITAL | Age: 74
LOS: 1 days | Discharge: HOME | End: 2019-02-03
Attending: INTERNAL MEDICINE | Admitting: INTERNAL MEDICINE
Payer: MEDICARE

## 2019-02-01 VITALS
HEART RATE: 63 BPM | TEMPERATURE: 98 F | SYSTOLIC BLOOD PRESSURE: 146 MMHG | OXYGEN SATURATION: 97 % | DIASTOLIC BLOOD PRESSURE: 89 MMHG | RESPIRATION RATE: 19 BRPM | WEIGHT: 179.9 LBS | HEIGHT: 64 IN

## 2019-02-01 DIAGNOSIS — R07.9 CHEST PAIN, UNSPECIFIED: ICD-10-CM

## 2019-02-01 DIAGNOSIS — Z29.9 ENCOUNTER FOR PROPHYLACTIC MEASURES, UNSPECIFIED: ICD-10-CM

## 2019-02-01 DIAGNOSIS — Z90.710 ACQUIRED ABSENCE OF BOTH CERVIX AND UTERUS: Chronic | ICD-10-CM

## 2019-02-01 DIAGNOSIS — K21.9 GASTRO-ESOPHAGEAL REFLUX DISEASE WITHOUT ESOPHAGITIS: ICD-10-CM

## 2019-02-01 DIAGNOSIS — R06.03 ACUTE RESPIRATORY DISTRESS: ICD-10-CM

## 2019-02-01 DIAGNOSIS — J45.901 UNSPECIFIED ASTHMA WITH (ACUTE) EXACERBATION: ICD-10-CM

## 2019-02-01 DIAGNOSIS — R74.8 ABNORMAL LEVELS OF OTHER SERUM ENZYMES: ICD-10-CM

## 2019-02-01 DIAGNOSIS — Z95.0 PRESENCE OF CARDIAC PACEMAKER: Chronic | ICD-10-CM

## 2019-02-01 DIAGNOSIS — J96.01 ACUTE RESPIRATORY FAILURE WITH HYPOXIA: ICD-10-CM

## 2019-02-01 DIAGNOSIS — I10 ESSENTIAL (PRIMARY) HYPERTENSION: ICD-10-CM

## 2019-02-01 LAB
ALBUMIN SERPL ELPH-MCNC: 3.2 G/DL — LOW (ref 3.3–5)
ALP SERPL-CCNC: 99 U/L — SIGNIFICANT CHANGE UP (ref 40–120)
ALT FLD-CCNC: 21 U/L — SIGNIFICANT CHANGE UP (ref 12–78)
ANION GAP SERPL CALC-SCNC: 5 MMOL/L — SIGNIFICANT CHANGE UP (ref 5–17)
APTT BLD: 32.3 SEC — SIGNIFICANT CHANGE UP (ref 27.5–36.3)
AST SERPL-CCNC: 16 U/L — SIGNIFICANT CHANGE UP (ref 15–37)
BILIRUB SERPL-MCNC: 0.2 MG/DL — SIGNIFICANT CHANGE UP (ref 0.2–1.2)
BUN SERPL-MCNC: 16 MG/DL — SIGNIFICANT CHANGE UP (ref 7–23)
CALCIUM SERPL-MCNC: 9 MG/DL — SIGNIFICANT CHANGE UP (ref 8.5–10.1)
CHLORIDE SERPL-SCNC: 108 MMOL/L — SIGNIFICANT CHANGE UP (ref 96–108)
CO2 SERPL-SCNC: 28 MMOL/L — SIGNIFICANT CHANGE UP (ref 22–31)
CREAT SERPL-MCNC: 1.07 MG/DL — SIGNIFICANT CHANGE UP (ref 0.5–1.3)
GLUCOSE SERPL-MCNC: 104 MG/DL — HIGH (ref 70–99)
HCT VFR BLD CALC: 36.9 % — SIGNIFICANT CHANGE UP (ref 34.5–45)
HGB BLD-MCNC: 11.9 G/DL — SIGNIFICANT CHANGE UP (ref 11.5–15.5)
INR BLD: 1.08 RATIO — SIGNIFICANT CHANGE UP (ref 0.88–1.16)
LIDOCAIN IGE QN: 389 U/L — SIGNIFICANT CHANGE UP (ref 73–393)
MCHC RBC-ENTMCNC: 26.6 PG — LOW (ref 27–34)
MCHC RBC-ENTMCNC: 32.2 GM/DL — SIGNIFICANT CHANGE UP (ref 32–36)
MCV RBC AUTO: 82.6 FL — SIGNIFICANT CHANGE UP (ref 80–100)
NRBC # BLD: 0 /100 WBCS — SIGNIFICANT CHANGE UP (ref 0–0)
NT-PROBNP SERPL-SCNC: 129 PG/ML — HIGH (ref 0–125)
PLATELET # BLD AUTO: 291 K/UL — SIGNIFICANT CHANGE UP (ref 150–400)
POTASSIUM SERPL-MCNC: 4.8 MMOL/L — SIGNIFICANT CHANGE UP (ref 3.5–5.3)
POTASSIUM SERPL-SCNC: 4.8 MMOL/L — SIGNIFICANT CHANGE UP (ref 3.5–5.3)
PROT SERPL-MCNC: 8.2 GM/DL — SIGNIFICANT CHANGE UP (ref 6–8.3)
PROTHROM AB SERPL-ACNC: 12.1 SEC — SIGNIFICANT CHANGE UP (ref 10–12.9)
RBC # BLD: 4.47 M/UL — SIGNIFICANT CHANGE UP (ref 3.8–5.2)
RBC # FLD: 13.5 % — SIGNIFICANT CHANGE UP (ref 10.3–14.5)
SODIUM SERPL-SCNC: 141 MMOL/L — SIGNIFICANT CHANGE UP (ref 135–145)
TROPONIN I SERPL-MCNC: 0.04 NG/ML — SIGNIFICANT CHANGE UP (ref 0.01–0.04)
TROPONIN I SERPL-MCNC: 0.05 NG/ML — HIGH (ref 0.01–0.04)
TROPONIN I SERPL-MCNC: 0.05 NG/ML — HIGH (ref 0.01–0.04)
WBC # BLD: 7.68 K/UL — SIGNIFICANT CHANGE UP (ref 3.8–10.5)
WBC # FLD AUTO: 7.68 K/UL — SIGNIFICANT CHANGE UP (ref 3.8–10.5)

## 2019-02-01 PROCEDURE — 99285 EMERGENCY DEPT VISIT HI MDM: CPT

## 2019-02-01 PROCEDURE — 99233 SBSQ HOSP IP/OBS HIGH 50: CPT

## 2019-02-01 PROCEDURE — 71045 X-RAY EXAM CHEST 1 VIEW: CPT | Mod: 26

## 2019-02-01 RX ORDER — MONTELUKAST 4 MG/1
10 TABLET, CHEWABLE ORAL AT BEDTIME
Qty: 0 | Refills: 0 | Status: DISCONTINUED | OUTPATIENT
Start: 2019-02-01 | End: 2019-02-03

## 2019-02-01 RX ORDER — MECLIZINE HCL 12.5 MG
1 TABLET ORAL
Qty: 0 | Refills: 0 | COMMUNITY

## 2019-02-01 RX ORDER — PANTOPRAZOLE SODIUM 20 MG/1
40 TABLET, DELAYED RELEASE ORAL
Qty: 0 | Refills: 0 | Status: DISCONTINUED | OUTPATIENT
Start: 2019-02-01 | End: 2019-02-03

## 2019-02-01 RX ORDER — BUDESONIDE AND FORMOTEROL FUMARATE DIHYDRATE 160; 4.5 UG/1; UG/1
2 AEROSOL RESPIRATORY (INHALATION)
Qty: 0 | Refills: 0 | Status: DISCONTINUED | OUTPATIENT
Start: 2019-02-01 | End: 2019-02-03

## 2019-02-01 RX ORDER — ASPIRIN/CALCIUM CARB/MAGNESIUM 324 MG
81 TABLET ORAL DAILY
Qty: 0 | Refills: 0 | Status: DISCONTINUED | OUTPATIENT
Start: 2019-02-01 | End: 2019-02-03

## 2019-02-01 RX ORDER — HEPARIN SODIUM 5000 [USP'U]/ML
5000 INJECTION INTRAVENOUS; SUBCUTANEOUS EVERY 12 HOURS
Qty: 0 | Refills: 0 | Status: DISCONTINUED | OUTPATIENT
Start: 2019-02-01 | End: 2019-02-03

## 2019-02-01 RX ORDER — METOPROLOL TARTRATE 50 MG
25 TABLET ORAL DAILY
Qty: 0 | Refills: 0 | Status: DISCONTINUED | OUTPATIENT
Start: 2019-02-01 | End: 2019-02-03

## 2019-02-01 RX ORDER — HYDRALAZINE HCL 50 MG
25 TABLET ORAL EVERY 12 HOURS
Qty: 0 | Refills: 0 | Status: DISCONTINUED | OUTPATIENT
Start: 2019-02-01 | End: 2019-02-03

## 2019-02-01 RX ORDER — POTASSIUM CHLORIDE 20 MEQ
10 PACKET (EA) ORAL DAILY
Qty: 0 | Refills: 0 | Status: DISCONTINUED | OUTPATIENT
Start: 2019-02-01 | End: 2019-02-03

## 2019-02-01 RX ORDER — FUROSEMIDE 40 MG
20 TABLET ORAL EVERY OTHER DAY
Qty: 0 | Refills: 0 | Status: DISCONTINUED | OUTPATIENT
Start: 2019-02-02 | End: 2019-02-03

## 2019-02-01 RX ORDER — ASPIRIN/CALCIUM CARB/MAGNESIUM 324 MG
325 TABLET ORAL ONCE
Qty: 0 | Refills: 0 | Status: COMPLETED | OUTPATIENT
Start: 2019-02-01 | End: 2019-02-01

## 2019-02-01 RX ORDER — IPRATROPIUM/ALBUTEROL SULFATE 18-103MCG
3 AEROSOL WITH ADAPTER (GRAM) INHALATION EVERY 6 HOURS
Qty: 0 | Refills: 0 | Status: DISCONTINUED | OUTPATIENT
Start: 2019-02-01 | End: 2019-02-03

## 2019-02-01 RX ORDER — ALPRAZOLAM 0.25 MG
0.25 TABLET ORAL ONCE
Qty: 0 | Refills: 0 | Status: DISCONTINUED | OUTPATIENT
Start: 2019-02-01 | End: 2019-02-01

## 2019-02-01 RX ADMIN — Medication 325 MILLIGRAM(S): at 10:01

## 2019-02-01 RX ADMIN — PANTOPRAZOLE SODIUM 40 MILLIGRAM(S): 20 TABLET, DELAYED RELEASE ORAL at 16:24

## 2019-02-01 RX ADMIN — HEPARIN SODIUM 5000 UNIT(S): 5000 INJECTION INTRAVENOUS; SUBCUTANEOUS at 18:02

## 2019-02-01 RX ADMIN — Medication 3 MILLILITER(S): at 23:23

## 2019-02-01 RX ADMIN — MONTELUKAST 10 MILLIGRAM(S): 4 TABLET, CHEWABLE ORAL at 21:47

## 2019-02-01 RX ADMIN — BUDESONIDE AND FORMOTEROL FUMARATE DIHYDRATE 2 PUFF(S): 160; 4.5 AEROSOL RESPIRATORY (INHALATION) at 21:48

## 2019-02-01 RX ADMIN — Medication 0.25 MILLIGRAM(S): at 10:01

## 2019-02-01 RX ADMIN — Medication 3 MILLILITER(S): at 18:44

## 2019-02-01 NOTE — ED PROVIDER NOTE - OBJECTIVE STATEMENT
72yo female with pmh HTN, Hl, PPM on 12/8 for sinus cecilio presents with sob and CP.  Pt reports SS chest pain x 1 week each time she noted her BP was up 160s/110.  d/w pt her BP is 128/76 currently and pt denies cp.  CP is pressure, nonradiating. Pt reports chronic sob since PPM placement, with mult visits, and neg CTA. Last visit given xanax with good effect, however PMD would not prescribe her anymore as it was a controlled substance.     ROS: No fever/chills. No photophobia/eye pain/changes in vision, No ear pain/sore throat/dysphagia, + chest pain/ no palpitations. + SOB, no cough/stridor. No abdominal pain, N/V/D, no black/bloody bm. No dysuria/frequency/discharge, No headache. No Dizziness.  No rash.  No numbness/tingling/weakness.

## 2019-02-01 NOTE — H&P ADULT - NSHPLABSRESULTS_GEN_ALL_CORE
11.9   7.68  )-----------( 291      ( 01 Feb 2019 09:41 )             36.9   02-01    141  |  108  |  16  ----------------------------<  104<H>  4.8   |  28  |  1.07    Ca    9.0      01 Feb 2019 09:41    TPro  8.2  /  Alb  3.2<L>  /  TBili  0.2  /  DBili  x   /  AST  16  /  ALT  21  /  AlkPhos  99  02-01  CARDIAC MARKERS ( 01 Feb 2019 13:40 )  .048 ng/mL / x     / x     / x     / x      CARDIAC MARKERS ( 01 Feb 2019 09:41 )  .041 ng/mL / x     / x     / x     / x        < from: Xray Chest 1 View AP/PA. (02.01.19 @ 09:53) >      FINDINGS:   A left-sided AICD is in situ.  Cardiac silhouette is stable in appearance.  No acute congestive changes.  No bilateral infiltrate. No pleural effusion or pneumothorax.  There are degenerative changes of the thoracic spine.    IMPRESSION:   Stable chest.  Negative for acute cardiopulmonary process.    < end of copied text >

## 2019-02-01 NOTE — H&P ADULT - NSHPPHYSICALEXAM_GEN_ALL_CORE
Vital Signs Last 24 Hrs  T(C): 36.7 (01 Feb 2019 16:21), Max: 36.7 (01 Feb 2019 08:53)  T(F): 98.1 (01 Feb 2019 16:21), Max: 98.1 (01 Feb 2019 08:53)  HR: 62 (01 Feb 2019 16:21) (62 - 63)  BP: 132/75 (01 Feb 2019 16:21) (126/76 - 146/89)  BP(mean): --  RR: 18 (01 Feb 2019 16:21) (18 - 20)  SpO2: 98% (01 Feb 2019 16:21) (97% - 99%)    GENERAL: NAD,  well-developed  HEAD:  Atraumatic, Normocephalic  EYES: EOMI, PERRLA, conjunctiva and sclera clear  ENMT: No tonsillar erythema, exudates, or enlargement; Moist mucous membranes  NECK: Supple, No JVD, Normal thyroid  NERVOUS SYSTEM:  Alert & Oriented X3, Good concentration; Motor Strength 5/5 B/L upper and lower extremities; DTRs 2+ intact and symmetric  CHEST/LUNG:  Bilateral wheezing   HEART: Regular rate and rhythm; No murmurs, rubs, or gallops; Left chest Pacemaker  ABDOMEN: Soft, Nontender, Nondistended; Bowel sounds present  EXTREMITIES:  2+ Peripheral Pulses, trace  edema  LYMPH: No lymphadenopathy noted  SKIN: No rashes or lesions

## 2019-02-01 NOTE — H&P ADULT - NSHPOUTPATIENTPROVIDERS_GEN_ALL_CORE
Dr Pettit-PCP  Dr Benites-Pulmonary  Dr Saeed-Cardiology [address] [facility], [place]

## 2019-02-01 NOTE — PATIENT PROFILE ADULT - VISION (WITH CORRECTIVE LENSES IF THE PATIENT USUALLY WEARS THEM):
Normal vision: sees adequately in most situations; can see medication labels, newsprint His LDH is again increasing without other clear cause. 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 He has no power spikes or other evidence of VAD malfunction.   To minimize risk of progression and to balance with risk of recurrent bleeding, I will start heparin with a goal aPTT of 40-60.

## 2019-02-01 NOTE — ED ADULT TRIAGE NOTE - CHIEF COMPLAINT QUOTE
pt states " I had a pacemaker placed 6weeks ago and since Wednesday I have been having pain in the left chest area under my breast with sob, and today the pain is worst." pt states " I had a pacemaker placed 6weeks ago and since Wednesday I have been having pain in the left chest area under my breast with sob, and today the pain is worst." hx of htn, hdl and pace-maker.

## 2019-02-01 NOTE — H&P ADULT - PROBLEM SELECTOR PLAN 3
Oxygen  TTE to r/o pulmonary HTN  Duoneb Q6H - 02 via NC to maintain sat > 92%  - Atrial enlargement on TTE on 12/2018 with normal pulm artery, would not repeat  - would obtain Pulm input.

## 2019-02-01 NOTE — H&P ADULT - ATTENDING COMMENTS
Patient seen, examined, and interviewed.  Agree with above assessment and plan.  Probably pulmonary etiology of SOB.  Would benefit from LABA therapy and outpatient pulmonary f/u.  R/O cardiac ischemia with serial trops and cardiology eval.

## 2019-02-01 NOTE — H&P ADULT - NSHPREVIEWOFSYSTEMS_GEN_ALL_CORE
CONSTITUTIONAL: Fatigued  EYES: No eye pain, visual disturbances, or discharge  ENMT: Denies  difficulty hearing, tinnitis, vertigo, sinus or   throat pain   NECK: Denies pain or stiffness  BREAST: Denies pain, masses, or nipple discharge    RESP: SOB, MAURER, dyspnea with activity; Denies cough, wheezing, chills or hemoptysis   CV: Intermittent chest pressure, SOB, MAURER; Denies palpitations, dizziness, lightheadedness or leg swelling  GI:  Epigastric pain intermittently, occasional heartburn; Denies abdominal pain, nausea, vomiting, hematemesis; diarrhea or changes in bowel pattern, no melena or hematochezia.  : Denies dysuria, urgency, frequency, retention, hematuria or incontinence  SKIN: Denies itching, burning, rashes or lesions  MSK: Denies edema, pain, difficulty with ambulation, joint pain or swelling, muscle or back pain  NEURO: Denies weakness, numbness, tingling, loss of sensation, vision, headaches, memory loss  LYMPH: Denies pain or enlarged glands  ENDO: Denies heat or cold intolerances, hair loss  PSYCH: Denies depression, anxiety of SI  HEME: Denies easy brusing or bleeding gums  ALLERGY/IMMUNOLOGY: Denies hives, eczema

## 2019-02-01 NOTE — H&P ADULT - PROBLEM SELECTOR PLAN 1
Admit to telemetry  Chest pressure intermittently  TTE pending  Cardiology consult Dr Riggs Admit to telemetry  Chest pressure intermittently    Cardiology consult Dr Riggs

## 2019-02-01 NOTE — PATIENT PROFILE ADULT - NSPROEDALEARNPREF_GEN_A_NUR
group instruction/pictorial/computer/internet/skill demonstration/audio/written material/individual instruction/verbal instruction/video

## 2019-02-01 NOTE — H&P ADULT - PROBLEM SELECTOR PLAN 2
Trend Cardiac enzymes  Cardiology consult Dr Riggs pending  TTE pending Trend Cardiac enzymes  Cardiology consult Dr Riggs pending

## 2019-02-01 NOTE — ED ADULT NURSE NOTE - CHIEF COMPLAINT QUOTE
pt states " I had a pacemaker placed 6weeks ago and since Wednesday I have been having pain in the left chest area under my breast with sob, and today the pain is worst." hx of htn, hdl and pace-maker.

## 2019-02-01 NOTE — H&P ADULT - HISTORY OF PRESENT ILLNESS
73 year old female, PMH HTN, HLD, PPM on 12/8 for sinus cecilio presents with SOB and Chest Pressure.  Patient reported that she has ongoing SOB since insertion of her Pacemaker and had been to the hospital and MD office on multiple occasions. This current episode of SOB started 2 weeks ago and started to progressively worsen since Sunday. Reports that SOB often wakes her up from her sleep and is accompanied by MAURER and fatigue. Ambulating to the bathroom will precipitate an episode of SOB.  Describes chest pressure as intermittent, non radiating and located in epigastric area.  Patient had negative CTA and was given Xanax on last visit to ED. However PMD would not prescribe her anymore as it was a controlled substance.

## 2019-02-01 NOTE — H&P ADULT - ASSESSMENT
73 year old female, PMH HTN, HLD, PPM on 12/8 for sinus cecilio presents with SOB and Chest Pressure.  Patient reported that she has ongoing SOB since insertion of her Pacemaker and had been to the hospital and MD office on multiple occasions. This current episode of SOB started 2 weeks ago and started to progressively worsen since Sunday. Reports that SOB often wakes her up from her sleep and is accompanied by MAURER and fatigue. Ambulating to the bathroom will precipitate an episode of SOB.  Describes chest pressure as intermittent, non radiating and located in epigastric area.  Patient had negative CTA and was given Xanax on last visit to ED. However PMD would not prescribe her anymore as it was a controlled substance. Patient admitted for elevated troponin and Asthma exacerbation. Will require 1-2 days of inpatient treatment.    IMPROVE VTE Individual Risk Assessment          RISK                                                          Points    [  ] Previous VTE                                                3    [  ] Thrombophilia                                             2    [  ] Lower limb paralysis                                    2        (unable to hold up >15 seconds)      [  ] Current Cancer                                             2         (within 6 months)    [  ] Immobilization > 24 hrs                              1    [  ] ICU/CCU stay > 24 hours                            1    [ x ] Age > 60                                                    1    IMPROVE VTE Score ________1_    Low risk 0-1: [  ] Early ambulation                          [  ] Intermittent Compression Device    High Risk 2-12: [  ] Heparin 5,000 units SC Q8 H                              [ x ] Heparin 5,000 units SC Q 12 H                              [  ] LMWH 40 mg SC daily (CrCl > 30 ml)

## 2019-02-02 DIAGNOSIS — N18.3 CHRONIC KIDNEY DISEASE, STAGE 3 (MODERATE): ICD-10-CM

## 2019-02-02 DIAGNOSIS — I49.8 OTHER SPECIFIED CARDIAC ARRHYTHMIAS: ICD-10-CM

## 2019-02-02 LAB
ANION GAP SERPL CALC-SCNC: 8 MMOL/L — SIGNIFICANT CHANGE UP (ref 5–17)
BUN SERPL-MCNC: 17 MG/DL — SIGNIFICANT CHANGE UP (ref 7–23)
CALCIUM SERPL-MCNC: 8.9 MG/DL — SIGNIFICANT CHANGE UP (ref 8.5–10.1)
CHLORIDE SERPL-SCNC: 106 MMOL/L — SIGNIFICANT CHANGE UP (ref 96–108)
CO2 SERPL-SCNC: 27 MMOL/L — SIGNIFICANT CHANGE UP (ref 22–31)
CREAT SERPL-MCNC: 1.12 MG/DL — SIGNIFICANT CHANGE UP (ref 0.5–1.3)
GLUCOSE SERPL-MCNC: 121 MG/DL — HIGH (ref 70–99)
HCT VFR BLD CALC: 36.6 % — SIGNIFICANT CHANGE UP (ref 34.5–45)
HCV AB S/CO SERPL IA: 0.05 S/CO — SIGNIFICANT CHANGE UP
HCV AB SERPL-IMP: SIGNIFICANT CHANGE UP
HGB BLD-MCNC: 11.8 G/DL — SIGNIFICANT CHANGE UP (ref 11.5–15.5)
MCHC RBC-ENTMCNC: 26.6 PG — LOW (ref 27–34)
MCHC RBC-ENTMCNC: 32.2 GM/DL — SIGNIFICANT CHANGE UP (ref 32–36)
MCV RBC AUTO: 82.6 FL — SIGNIFICANT CHANGE UP (ref 80–100)
NRBC # BLD: 0 /100 WBCS — SIGNIFICANT CHANGE UP (ref 0–0)
PLATELET # BLD AUTO: 296 K/UL — SIGNIFICANT CHANGE UP (ref 150–400)
POTASSIUM SERPL-MCNC: 4.1 MMOL/L — SIGNIFICANT CHANGE UP (ref 3.5–5.3)
POTASSIUM SERPL-SCNC: 4.1 MMOL/L — SIGNIFICANT CHANGE UP (ref 3.5–5.3)
RBC # BLD: 4.43 M/UL — SIGNIFICANT CHANGE UP (ref 3.8–5.2)
RBC # FLD: 13.6 % — SIGNIFICANT CHANGE UP (ref 10.3–14.5)
SODIUM SERPL-SCNC: 141 MMOL/L — SIGNIFICANT CHANGE UP (ref 135–145)
TROPONIN I SERPL-MCNC: 0.04 NG/ML — SIGNIFICANT CHANGE UP (ref 0.01–0.04)
WBC # BLD: 7.12 K/UL — SIGNIFICANT CHANGE UP (ref 3.8–10.5)
WBC # FLD AUTO: 7.12 K/UL — SIGNIFICANT CHANGE UP (ref 3.8–10.5)

## 2019-02-02 PROCEDURE — 99233 SBSQ HOSP IP/OBS HIGH 50: CPT

## 2019-02-02 PROCEDURE — 99223 1ST HOSP IP/OBS HIGH 75: CPT

## 2019-02-02 RX ADMIN — HEPARIN SODIUM 5000 UNIT(S): 5000 INJECTION INTRAVENOUS; SUBCUTANEOUS at 05:23

## 2019-02-02 RX ADMIN — Medication 81 MILLIGRAM(S): at 12:01

## 2019-02-02 RX ADMIN — Medication 20 MILLIGRAM(S): at 12:01

## 2019-02-02 RX ADMIN — PANTOPRAZOLE SODIUM 40 MILLIGRAM(S): 20 TABLET, DELAYED RELEASE ORAL at 05:23

## 2019-02-02 RX ADMIN — Medication 3 MILLILITER(S): at 05:39

## 2019-02-02 RX ADMIN — Medication 25 MILLIGRAM(S): at 05:23

## 2019-02-02 RX ADMIN — HEPARIN SODIUM 5000 UNIT(S): 5000 INJECTION INTRAVENOUS; SUBCUTANEOUS at 18:19

## 2019-02-02 RX ADMIN — BUDESONIDE AND FORMOTEROL FUMARATE DIHYDRATE 2 PUFF(S): 160; 4.5 AEROSOL RESPIRATORY (INHALATION) at 05:24

## 2019-02-02 RX ADMIN — MONTELUKAST 10 MILLIGRAM(S): 4 TABLET, CHEWABLE ORAL at 22:00

## 2019-02-02 RX ADMIN — Medication 10 MILLIEQUIVALENT(S): at 12:01

## 2019-02-02 RX ADMIN — BUDESONIDE AND FORMOTEROL FUMARATE DIHYDRATE 2 PUFF(S): 160; 4.5 AEROSOL RESPIRATORY (INHALATION) at 18:20

## 2019-02-02 RX ADMIN — Medication 3 MILLILITER(S): at 17:08

## 2019-02-02 RX ADMIN — Medication 3 MILLILITER(S): at 11:11

## 2019-02-02 NOTE — CONSULT NOTE ADULT - ASSESSMENT
73 year old female, PMH HTN, HLD,  [employer] PPM on 12/8 at [place] with Dr. Marc 2/2 SSS; presents with SOB and chest pressure.  Patient reported that she has ongoing SOB since insertion of her Pacemaker and had been to the hospital and MD office on multiple occasions. This current episode of SOB started 2 weeks ago and started to progressively worsen since Sunday. Reports that SOB often wakes her up from her sleep and is accompanied by MAURER and fatigue. Ambulating to the bathroom will precipitate an episode of SOB.  Describes chest pressure as intermittent, non radiating and located in epigastric area.  Patient had negative CTA and was given Xanax on last visit to ED. However PMD would not prescribe her anymore as it was a controlled substance.    No overnight events; no tele events (device functioning well on tele review).     Similar admission a few weeks post PPM implant.    Fernando remain stable x 4 at 0.04.    -monitor on tele  -echo pending to r/o effusion  -[employer] device interrogation pending  -EKG not ischemic/no tele events  -CXR appears normal  -will reach out to Dr. Marc 73 year old female, PMH HTN, HLD,  [employer] PPM on 12/8 at [place] with Dr. Marc 2/2 SSS; presents with SOB and chest pressure.  Patient reported that she has ongoing SOB since insertion of her Pacemaker and had been to the hospital and MD office on multiple occasions. This current episode of SOB started 2 weeks ago and started to progressively worsen since Sunday. Reports that SOB often wakes her up from her sleep and is accompanied by MAURER and fatigue. Ambulating to the bathroom will precipitate an episode of SOB.  Describes chest pressure as intermittent, non radiating and located in epigastric area.  Patient had negative CTA and was given Xanax on last visit to ED. However PMD would not prescribe her anymore as it was a controlled substance.    No overnight events; no tele events (device functioning well on tele review).     Similar admission a few weeks post PPM implant.    Fernando remain stable x 4 at 0.04.  Nuclear stress test 12/5/18 normal.    -monitor on tele  -echo pending to r/o effusion  -[employer] device interrogation pending  -EKG not ischemic/no tele events  -CXR appears normal; lead look intact and in correct position (V lead placed there for parahisian pacing)  -will reach out to Dr. Marc 73 year old female, PMH HTN, HLD,  [employer] PPM on 12/8 at [place] with Dr. Marc 2/2 SSS; presents with SOB and chest pressure.  Patient reported that she has ongoing SOB since insertion of her Pacemaker and had been to the hospital and MD office on multiple occasions. This current episode of SOB started 2 weeks ago and started to progressively worsen since Sunday. Reports that SOB often wakes her up from her sleep and is accompanied by MAURER and fatigue. Ambulating to the bathroom will precipitate an episode of SOB.  Describes chest pressure as intermittent, non radiating and located in epigastric area.  Patient had negative CTA and was given Xanax on last visit to ED. However PMD would not prescribe her anymore as it was a controlled substance.    No overnight events; no tele events (device functioning well on tele review).     Similar admission a few weeks post PPM implant.    Fernando remain stable x 4 at 0.04.  Nuclear stress test 12/5/18 normal.    -monitor on tele  -echo pending to r/o effusion  -[employer] device interrogation pending  -EKG not ischemic/no tele events  -CXR appears normal; lead look intact and in correct position (V lead placed there for parahisian pacing)  -right diaphragm appears elevated... ?reason for dyspnea / ?pulm eval

## 2019-02-02 NOTE — CONSULT NOTE ADULT - SUBJECTIVE AND OBJECTIVE BOX
CARDIOLOGY CONSULT NOTE    Patient is a 73y Female with a known history of :  Acute respiratory failure with hypoxia (J96.01)  Preventive measure (Z29.9)  Essential hypertension (I10)  Gastroesophageal reflux disease, esophagitis presence not specified (K21.9)  Exacerbation of asthma, unspecified asthma severity, unspecified whether persistent (J45.901)  Respiratory distress, acute (R06.03)  Elevated troponin (R74.8)  Chest pain, unspecified type (R07.9)    HPI:  73 year old female, PMH HTN, HLD,  [employer] PPM on 12/8 at [place] with Dr. Marc 2/2 SSS; presents with SOB and chest pressure.  Patient reported that she has ongoing SOB since insertion of her Pacemaker and had been to the hospital and MD office on multiple occasions. This current episode of SOB started 2 weeks ago and started to progressively worsen since Sunday. Reports that SOB often wakes her up from her sleep and is accompanied by MAURER and fatigue. Ambulating to the bathroom will precipitate an episode of SOB.  Describes chest pressure as intermittent, non radiating and located in epigastric area.  Patient had negative CTA and was given Xanax on last visit to ED. However PMD would not prescribe her anymore as it was a controlled substance.    No overnight events; no tele events (device functioning well on tele review).     Similar admission a few weeks post PPM implant.      REVIEW OF SYSTEMS:    CONSTITUTIONAL: No fever, weight loss, or fatigue  EYES: No eye pain, visual disturbances, or discharge  ENMT:  No difficulty hearing, tinnitus, vertigo; No sinus or throat pain  NECK: No pain or stiffness  BREASTS: No pain, masses, or nipple discharge  RESPIRATORY: No cough, wheezing, chills or hemoptysis; + shortness of breath  CARDIOVASCULAR: No chest pain, palpitations, dizziness, or leg swelling  GASTROINTESTINAL: No abdominal or epigastric pain. No nausea, vomiting, or hematemesis; No diarrhea or constipation. No melena or hematochezia.  GENITOURINARY: No dysuria, frequency, hematuria, or incontinence  NEUROLOGICAL: No headaches, memory loss, loss of strength, numbness, or tremors  SKIN: No itching, burning, rashes, or lesions   LYMPH NODES: No enlarged glands  ENDOCRINE: No heat or cold intolerance; No hair loss  MUSCULOSKELETAL: No joint pain or swelling; No muscle, back, or extremity pain  PSYCHIATRIC: No depression, anxiety, mood swings, or difficulty sleeping  HEME/LYMPH: No easy bruising, or bleeding gums  ALLERGY AND IMMUNOLOGIC: No hives or eczema    MEDICATIONS  (STANDING):  ALBUTerol/ipratropium for Nebulization 3 milliLiter(s) Nebulizer every 6 hours  aspirin enteric coated 81 milliGRAM(s) Oral daily  buDESOnide 160 MICROgram(s)/formoterol 4.5 MICROgram(s) Inhaler 2 Puff(s) Inhalation two times a day  furosemide   Oral Tab/Cap - Peds 20 milliGRAM(s) Oral every other day  heparin  Injectable 5000 Unit(s) SubCutaneous every 12 hours  hydrALAZINE 25 milliGRAM(s) Oral every 12 hours  metoprolol succinate ER 25 milliGRAM(s) Oral daily  montelukast 10 milliGRAM(s) Oral at bedtime  pantoprazole    Tablet 40 milliGRAM(s) Oral before breakfast  potassium chloride    Tablet ER 10 milliEquivalent(s) Oral daily    MEDICATIONS  (PRN):      ALLERGIES: amlodipine (Angioedema)      FAMILY HISTORY:  No pertinent family history in first degree relatives      PHYSICAL EXAMINATION:  -----------------------------  T(C): 36.7 (02-02-19 @ 11:10), Max: 36.7 (02-01-19 @ 16:12)  HR: 80 (02-02-19 @ 11:10) (58 - 92)  BP: 112/88 (02-02-19 @ 11:10) (103/82 - 140/74)  RR: 16 (02-02-19 @ 11:10) (16 - 20)  SpO2: 96% (02-02-19 @ 11:10) (95% - 100%)  Wt(kg): --      Weight (kg): 95.6 (02-01 @ 17:20)    Constitutional: well developed, normal appearance, well groomed, well nourished, no deformities and no acute distress.   Eyes: the conjunctiva exhibited no abnormalities and the eyelids demonstrated no xanthelasmas.   HEENT: normal oral mucosa, no oral pallor and no oral cyanosis.   Neck: normal jugular venous A waves present, normal jugular venous V waves present and no jugular venous garcia A waves.   Pulmonary: no respiratory distress, normal respiratory rhythm and effort, no accessory muscle use and lungs were clear to auscultation bilaterally.   Cardiovascular: heart rate and rhythm were normal, normal S1 and S2 and no murmur, gallop, rub, heave or thrill are present.   Abdomen: soft, non-tender, no hepato-splenomegaly and no abdominal mass palpated.   Musculoskeletal: the gait could not be assessed..   Extremities: no clubbing of the fingernails, no localized cyanosis, no petechial hemorrhages and no ischemic changes.   Skin: normal skin color and pigmentation, no rash, no venous stasis, no skin lesions, no skin ulcer and no xanthoma was observed.   Psychiatric: oriented to person, place, and time, the affect was normal, the mood was normal and not feeling anxious.     LABS:   --------  02-02    141  |  106  |  17  ----------------------------<  121<H>  4.1   |  27  |  1.12  Ca    8.9      02 Feb 2019 09:44    TPro  8.2  /  Alb  3.2<L>  /  TBili  0.2  /  DBili  x   /  AST  16  /  ALT  21  /  AlkPhos  99  02-01                         11.8   7.12  )-----------( 296      ( 02 Feb 2019 09:44 )             36.6     PT/INR - ( 01 Feb 2019 09:41 )   PT: 12.1 sec;   INR: 1.08 ratio         PTT - ( 01 Feb 2019 09:41 )  PTT:32.3 sec    02-02 @ 09:44 CPK total:--, CKMB --, Troponin I - .044 ng/mL  02-01 @ 23:07 CPK total:--, CKMB --, Troponin I - .054 ng/mL<H>  02-01 @ 13:40 CPK total:--, CKMB --, Troponin I - .048 ng/mL<H>  02-01 @ 09:41 CPK total:--, CKMB --, Troponin I - .041 ng/mL          RADIOLOGY:  -----------------    ECG:  NSR 67bpm; a-paced w prolonged AV delay; PVC

## 2019-02-02 NOTE — PROGRESS NOTE ADULT - PROBLEM SELECTOR PLAN 1
Resolved.   mildly elevated trop, no ischemic changes on EKG  Recent Echo and Stress test was benign  Cardiology consult appreciated. Resolved.   mildly elevated trop, no ischemic changes on EKG  Recent Echo and Stress test was benign  Cardiology consult appreciated. f/u medtr[employer] interrogation

## 2019-02-03 ENCOUNTER — TRANSCRIPTION ENCOUNTER (OUTPATIENT)
Age: 74
End: 2019-02-03

## 2019-02-03 VITALS
DIASTOLIC BLOOD PRESSURE: 85 MMHG | SYSTOLIC BLOOD PRESSURE: 130 MMHG | RESPIRATION RATE: 18 BRPM | OXYGEN SATURATION: 96 % | TEMPERATURE: 98 F | HEART RATE: 60 BPM

## 2019-02-03 PROCEDURE — 99239 HOSP IP/OBS DSCHRG MGMT >30: CPT

## 2019-02-03 PROCEDURE — 99232 SBSQ HOSP IP/OBS MODERATE 35: CPT

## 2019-02-03 RX ORDER — MONTELUKAST 4 MG/1
1 TABLET, CHEWABLE ORAL
Qty: 30 | Refills: 0
Start: 2019-02-03 | End: 2019-03-04

## 2019-02-03 RX ORDER — BUDESONIDE AND FORMOTEROL FUMARATE DIHYDRATE 160; 4.5 UG/1; UG/1
2 AEROSOL RESPIRATORY (INHALATION)
Qty: 1 | Refills: 0
Start: 2019-02-03 | End: 2019-03-04

## 2019-02-03 RX ADMIN — Medication 25 MILLIGRAM(S): at 05:42

## 2019-02-03 RX ADMIN — Medication 10 MILLIEQUIVALENT(S): at 11:34

## 2019-02-03 RX ADMIN — Medication 3 MILLILITER(S): at 00:12

## 2019-02-03 RX ADMIN — PANTOPRAZOLE SODIUM 40 MILLIGRAM(S): 20 TABLET, DELAYED RELEASE ORAL at 06:47

## 2019-02-03 RX ADMIN — Medication 81 MILLIGRAM(S): at 11:34

## 2019-02-03 RX ADMIN — Medication 3 MILLILITER(S): at 06:15

## 2019-02-03 RX ADMIN — BUDESONIDE AND FORMOTEROL FUMARATE DIHYDRATE 2 PUFF(S): 160; 4.5 AEROSOL RESPIRATORY (INHALATION) at 05:42

## 2019-02-03 RX ADMIN — HEPARIN SODIUM 5000 UNIT(S): 5000 INJECTION INTRAVENOUS; SUBCUTANEOUS at 05:42

## 2019-02-03 NOTE — DISCHARGE NOTE ADULT - SECONDARY DIAGNOSIS.
Asthma, unspecified asthma severity, unspecified whether complicated, unspecified whether persistent Chest pain, unspecified type Bradyarrhythmia CKD (chronic kidney disease), stage III Essential hypertension

## 2019-02-03 NOTE — PROGRESS NOTE ADULT - SUBJECTIVE AND OBJECTIVE BOX
CARDIOLOGY CONSULT NOTE    Patient is a 73y Female with a known history of :  Acute respiratory failure with hypoxia (J96.01)  Preventive measure (Z29.9)  Essential hypertension (I10)  Gastroesophageal reflux disease, esophagitis presence not specified (K21.9)  Exacerbation of asthma, unspecified asthma severity, unspecified whether persistent (J45.901)  Respiratory distress, acute (R06.03)  Elevated troponin (R74.8)  Chest pain, unspecified type (R07.9)    HPI:  73 year old female, PMH HTN, HLD,  [employer] PPM on 12/8 at [place] with Dr. Marc 2/2 SSS; presents with SOB and chest pressure.  Patient reported that she has ongoing SOB since insertion of her Pacemaker and had been to the hospital and MD office on multiple occasions. This current episode of SOB started 2 weeks ago and started to progressively worsen since Sunday. Reports that SOB often wakes her up from her sleep and is accompanied by MAURER and fatigue. Ambulating to the bathroom will precipitate an episode of SOB.  Describes chest pressure as intermittent, non radiating and located in epigastric area.  Patient had negative CTA and was given Xanax on last visit to ED. However PMD would not prescribe her anymore as it was a controlled substance.    No overnight events; no tele events (device functioning well on tele review and on interrogation).     Similar admission a few weeks post PPM implant.      REVIEW OF SYSTEMS:    CONSTITUTIONAL: No fever, weight loss, or fatigue  EYES: No eye pain, visual disturbances, or discharge  ENMT:  No difficulty hearing, tinnitus, vertigo; No sinus or throat pain  NECK: No pain or stiffness  BREASTS: No pain, masses, or nipple discharge  RESPIRATORY: No cough, wheezing, chills or hemoptysis; + shortness of breath  CARDIOVASCULAR: No chest pain, palpitations, dizziness, or leg swelling  GASTROINTESTINAL: No abdominal or epigastric pain. No nausea, vomiting, or hematemesis; No diarrhea or constipation. No melena or hematochezia.  GENITOURINARY: No dysuria, frequency, hematuria, or incontinence  NEUROLOGICAL: No headaches, memory loss, loss of strength, numbness, or tremors  SKIN: No itching, burning, rashes, or lesions   LYMPH NODES: No enlarged glands  ENDOCRINE: No heat or cold intolerance; No hair loss  MUSCULOSKELETAL: No joint pain or swelling; No muscle, back, or extremity pain  PSYCHIATRIC: No depression, anxiety, mood swings, or difficulty sleeping  HEME/LYMPH: No easy bruising, or bleeding gums  ALLERGY AND IMMUNOLOGIC: No hives or eczema    MEDICATIONS  (STANDING):  ALBUTerol/ipratropium for Nebulization 3 milliLiter(s) Nebulizer every 6 hours  aspirin enteric coated 81 milliGRAM(s) Oral daily  buDESOnide 160 MICROgram(s)/formoterol 4.5 MICROgram(s) Inhaler 2 Puff(s) Inhalation two times a day  furosemide   Oral Tab/Cap - Peds 20 milliGRAM(s) Oral every other day  heparin  Injectable 5000 Unit(s) SubCutaneous every 12 hours  hydrALAZINE 25 milliGRAM(s) Oral every 12 hours  metoprolol succinate ER 25 milliGRAM(s) Oral daily  montelukast 10 milliGRAM(s) Oral at bedtime  pantoprazole    Tablet 40 milliGRAM(s) Oral before breakfast  potassium chloride    Tablet ER 10 milliEquivalent(s) Oral daily    MEDICATIONS  (PRN):        ALLERGIES: amlodipine (Angioedema)      FAMILY HISTORY:  No pertinent family history in first degree relatives      PHYSICAL EXAMINATION:  -----------------------------  Vital Signs Last 24 Hrs  T(C): 36.3 (03 Feb 2019 05:40), Max: 36.7 (02 Feb 2019 11:10)  T(F): 97.3 (03 Feb 2019 05:40), Max: 98 (02 Feb 2019 11:10)  HR: 64 (03 Feb 2019 05:40) (64 - 84)  BP: 126/53 (03 Feb 2019 05:40) (103/63 - 126/53)  RR: 18 (03 Feb 2019 05:40) (16 - 18)  SpO2: 98% (03 Feb 2019 05:40) (96% - 98%)    Constitutional: well developed, normal appearance, well groomed, well nourished, no deformities and no acute distress.   Eyes: the conjunctiva exhibited no abnormalities and the eyelids demonstrated no xanthelasmas.   HEENT: normal oral mucosa, no oral pallor and no oral cyanosis.   Neck: normal jugular venous A waves present, normal jugular venous V waves present and no jugular venous garcia A waves.   Pulmonary: no respiratory distress, normal respiratory rhythm and effort, no accessory muscle use and lungs were clear to auscultation bilaterally.   Cardiovascular: heart rate and rhythm were normal, normal S1 and S2 and no murmur, gallop, rub, heave or thrill are present.   Abdomen: soft, non-tender, no hepato-splenomegaly and no abdominal mass palpated.   Musculoskeletal: the gait could not be assessed..   Extremities: no clubbing of the fingernails, no localized cyanosis, no petechial hemorrhages and no ischemic changes.   Skin: normal skin color and pigmentation, no rash, no venous stasis, no skin lesions, no skin ulcer and no xanthoma was observed.   Psychiatric: oriented to person, place, and time, the affect was normal, the mood was normal and not feeling anxious.     LABS:   -------                          11.8   7.12  )-----------( 296      ( 02 Feb 2019 09:44 )             36.6   02-02    141  |  106  |  17  ----------------------------<  121<H>  4.1   |  27  |  1.12    Ca    8.9      02 Feb 2019 09:44      CARDIAC MARKERS ( 02 Feb 2019 09:44 )  .044 ng/mL / x     / x     / x     / x      CARDIAC MARKERS ( 01 Feb 2019 23:07 )  .054 ng/mL / x     / x     / x     / x      CARDIAC MARKERS ( 01 Feb 2019 13:40 )  .048 ng/mL / x     / x     / x     / x                RADIOLOGY:  -----------------    ECG:  NSR 67bpm; a-paced w prolonged AV delay; PVC
Patient is a 73y old  Female who presents with a chief complaint of SOB (02 Feb 2019 11:35)      INTERVAL HPI/ OVERNIGHT EVENTS: Pt was seen and examined at bedside today, No significant overnight events, pt denies any further CP, SOB, wheezing or cough.      MEDICATIONS  (STANDING):  ALBUTerol/ipratropium for Nebulization 3 milliLiter(s) Nebulizer every 6 hours  aspirin enteric coated 81 milliGRAM(s) Oral daily  buDESOnide 160 MICROgram(s)/formoterol 4.5 MICROgram(s) Inhaler 2 Puff(s) Inhalation two times a day  furosemide   Oral Tab/Cap - Peds 20 milliGRAM(s) Oral every other day  heparin  Injectable 5000 Unit(s) SubCutaneous every 12 hours  hydrALAZINE 25 milliGRAM(s) Oral every 12 hours  metoprolol succinate ER 25 milliGRAM(s) Oral daily  montelukast 10 milliGRAM(s) Oral at bedtime  pantoprazole    Tablet 40 milliGRAM(s) Oral before breakfast  potassium chloride    Tablet ER 10 milliEquivalent(s) Oral daily    MEDICATIONS  (PRN):      Allergies    amlodipine (Angioedema)    Intolerances        REVIEW OF SYSTEMS:    Unable to examine due to [ ] Encephalopathy [ ] Advanced Dementia [ ] Expressive Aphasia [ ] Non-verbal patient    CONSTITUTIONAL: No fever, NO generalized weakness/Fatigue, No weight loss  EYES: No eye pain, visual disturbances, or discharge  ENMT:  No difficulty hearing, tinnitus, vertigo; No sinus or throat pain  NECK: No pain or stiffness  RESPIRATORY: No shortness of breath,  cough, wheezing, sputum or hemoptysis   CARDIOVASCULAR: No chest pain, palpitations, or leg swelling  GASTROINTESTINAL: No abdominal pain. No nausea, vomiting, diarrhea or constipation. No melena or hematochezia.  GENITOURINARY: No dysuria, frequency, hematuria, or incontinence  NEUROLOGICAL: No headaches, Dizziness, memory loss, loss of strength, numbness, or tremors  SKIN: No itching, burning, rashes, or lesions   MUSCULOSKELETAL: No joint pain or swelling; No muscle, back, or extremity pain  PSYCHIATRIC: No depression, anxiety, mood swings, or difficulty sleeping  HEME/LYMPH: No easy bruising, or bleeding gums      Vital Signs Last 24 Hrs  T(C): 36.7 (02 Feb 2019 11:10), Max: 36.7 (01 Feb 2019 16:12)  T(F): 98 (02 Feb 2019 11:10), Max: 98.1 (01 Feb 2019 16:21)  HR: 78 (02 Feb 2019 11:45) (58 - 92)  BP: 112/88 (02 Feb 2019 11:10) (103/82 - 140/74)  BP(mean): --  RR: 16 (02 Feb 2019 11:10) (16 - 20)  SpO2: 96% (02 Feb 2019 11:45) (95% - 100%)    PHYSICAL EXAM:  GENERAL: NAD, well-developed, well-groomed  HEAD:  Atraumatic, Normocephalic  EYES: conjunctiva and sclera clear  ENMT: Moist mucous membranes  NECK: Supple, No JVD, Normal thyroid  CHEST/LUNG: Clear to Auscultation bilaterally; No rales, rhonchi, wheezing, or rubs  HEART: Regular rate and rhythm; No murmurs, rubs, or gallops  ABDOMEN: Soft, Nontender, Nondistended; Bowel sounds present  EXTREMITIES:  2+ Peripheral Pulses, No clubbing, cyanosis, or edema  SKIN: No rashes or lesions  NERVOUS SYSTEM:  Alert & Oriented X3, Good concentration; Motor Strength 5/5 B/L upper and lower extremities    LABS:                        11.8   7.12  )-----------( 296      ( 02 Feb 2019 09:44 )             36.6     02-02    141  |  106  |  17  ----------------------------<  121<H>  4.1   |  27  |  1.12    Ca    8.9      02 Feb 2019 09:44    TPro  8.2  /  Alb  3.2<L>  /  TBili  0.2  /  DBili  x   /  AST  16  /  ALT  21  /  AlkPhos  99  02-01    PT/INR - ( 01 Feb 2019 09:41 )   PT: 12.1 sec;   INR: 1.08 ratio         PTT - ( 01 Feb 2019 09:41 )  PTT:32.3 sec    CAPILLARY BLOOD GLUCOSE              RADIOLOGY & ADDITIONAL TESTS:          Imaging Personally Reviewed:  [ ] YES  [ ] NO    Consultant(s) Notes Reviewed:  [ ] YES  [ ] NO    Care Discussed with Consultants/Other Providers [x ] YES  [ ] NO

## 2019-02-03 NOTE — DISCHARGE NOTE ADULT - OTHER SIGNIFICANT FINDINGS
< from: Xray Chest 1 View AP/PA. (02.01.19 @ 09:53) >  FINDINGS:   A left-sided AICD is in situ.  Cardiac silhouette is stable in appearance.  No acute congestive changes.  No bilateral infiltrate. No pleural effusion or pneumothorax.  There are degenerative changes of the thoracic spine.    IMPRESSION:   Stable chest.  Negative for acute cardiopulmonary process.    < from: TTE Echo Doppler w/o Cont (12.04.18 @ 19:15) >  Summary:   1. Left ventricular ejection fraction, by visual estimation, is 55 to   60%.   2. There is no left ventricular hypertrophy.   3. Mild mitral annular calcification.   4. Trace tricuspid regurgitation.    < end of copied text >

## 2019-02-03 NOTE — DISCHARGE NOTE ADULT - PATIENT PORTAL LINK FT
You can access the [facility] Patient Portal, offered by [facility], by registering with the following website: [email or web address]

## 2019-02-03 NOTE — DISCHARGE NOTE ADULT - CARE PLAN
Principal Discharge DX:	Acute respiratory failure with hypoxia  Goal:	Resolved  Assessment and plan of treatment:	Presumed secondary to Asthma exacerbation  Secondary Diagnosis:	Asthma, unspecified asthma severity, unspecified whether complicated, unspecified whether persistent  Goal:	Acute exacerbation POA, resolved  Assessment and plan of treatment:	continue with Montelukast, Symbicort and albuterol as needed  Follow up with pulmonologist in 1 week.  Secondary Diagnosis:	Chest pain, unspecified type  Goal:	with mildly elevated Troponin. Resolved  Assessment and plan of treatment:	elevated troponin presumed to be secondary to demand ischemia.   Follow up with Cardiologist in one week.  Secondary Diagnosis:	Bradyarrhythmia  Goal:	with PPM  Assessment and plan of treatment:	PPM interrogation benign.  Secondary Diagnosis:	CKD (chronic kidney disease), stage III  Goal:	Stable  Assessment and plan of treatment:	follow up with PCP for monitoring.  Secondary Diagnosis:	Essential hypertension  Goal:	continue with home medications.

## 2019-02-03 NOTE — DISCHARGE NOTE ADULT - PROVIDER TOKENS
FREE:[LAST:[Dr Pettit-PCP],PHONE:[(   )    -],FAX:[(   )    -]],FREE:[LAST:[Dr Benites-Pulmonary],PHONE:[(   )    -],FAX:[(   )    -]],FREE:[LAST:[Dr Saeed-Cardiology],PHONE:[(   )    -],FAX:[(   )    -]]

## 2019-02-03 NOTE — DISCHARGE NOTE ADULT - MEDICATION SUMMARY - MEDICATIONS TO TAKE
I will START or STAY ON the medications listed below when I get home from the hospital:    aspirin 81 mg oral delayed release tablet  -- 1 tab(s) by mouth once a day HOSP  -- Indication: For Preventive measure    metoprolol succinate 25 mg oral tablet, extended release  -- 1 tab(s) by mouth once a day  -- Indication: For Essential hypertension    albuterol 90 mcg/inh inhalation aerosol  -- 2 puff(s) inhaled 4 times a day   -- For inhalation only.  It is very important that you take or use this exactly as directed.  Do not skip doses or discontinue unless directed by your doctor.  Obtain medical advice before taking any non-prescription drugs as some may affect the action of this medication.  Shake well before use.    -- Indication: For Asthma     budesonide-formoterol 160 mcg-4.5 mcg/inh inhalation aerosol  -- 2 puff(s) inhaled 2 times a day   -- Indication: For Asthma     Lasix 20 mg oral tablet  -- 1 tab(s) by mouth every other day  -- Indication: For Essential hypertension    montelukast 10 mg oral tablet  -- 1 tab(s) by mouth once a day (at bedtime)  -- Indication: For Asthma     potassium chloride 10 mEq oral capsule, extended release  -- 1 cap(s) by mouth once a day  -- Indication: For supplemental     hydrALAZINE 25 mg oral tablet  -- 1 tab(s) by mouth every 12 hours HOSP  -- Indication: For hypertension

## 2019-02-03 NOTE — PROGRESS NOTE ADULT - ASSESSMENT
73 year old female, PMH HTN, HLD,  [employer] PPM on 12/8 at [place] with Dr. Marc 2/2 SSS; presents with SOB and chest pressure.  Patient reported that she has ongoing SOB since insertion of her Pacemaker and had been to the hospital and MD office on multiple occasions. This current episode of SOB started 2 weeks ago and started to progressively worsen since Sunday. Reports that SOB often wakes her up from her sleep and is accompanied by MAURER and fatigue. Ambulating to the bathroom will precipitate an episode of SOB.  Describes chest pressure as intermittent, non radiating and located in epigastric area.  Patient had negative CTA and was given Xanax on last visit to ED. However PMD would not prescribe her anymore as it was a controlled substance.    No overnight events; no tele events (device functioning well on tele review).   ICD interrogation normal.  Similar admission a few weeks post PPM implant.    Fernando remain stable x 4 at 0.04.  Nuclear stress test 12/5/18 normal.    -monitor on tele  -echo pending to r/o effusion  -[employer]tr[employer] device interrogation normal  -EKG not ischemic/no tele events  -CXR appears normal; lead look intact and in correct position (V lead placed there for parahisian pacing)  -right diaphragm appears elevated... ?reason for dyspnea / ?pulm eval
73 year old female, PMH HTN, HLD, PPM on 12/8 for sinus cecilio presents with SOB and Chest Pressure.  Patient reported that she has ongoing SOB since insertion of her Pacemaker and had been to the hospital and MD office on multiple occasions. This current episode of SOB started 2 weeks ago and started to progressively worsen since Sunday. Reports that SOB often wakes her up from her sleep and is accompanied by MAURER and fatigue. Ambulating to the bathroom will precipitate an episode of SOB.  Describes chest pressure as intermittent, non radiating and located in epigastric area.  Patient had negative CTA and was given Xanax on last visit to ED. However PMD would not prescribe her anymore as it was a controlled substance. Patient admitted for elevated troponin and Asthma exacerbation. Will require 1-2 days of inpatient treatment.    IMPROVE VTE Individual Risk Assessment          RISK                                                          Points    [  ] Previous VTE                                                3    [  ] Thrombophilia                                             2    [  ] Lower limb paralysis                                    2        (unable to hold up >15 seconds)      [  ] Current Cancer                                             2         (within 6 months)    [  ] Immobilization > 24 hrs                              1    [  ] ICU/CCU stay > 24 hours                            1    [ x ] Age > 60                                                    1    IMPROVE VTE Score ________1_    Low risk 0-1: [  ] Early ambulation                          [  ] Intermittent Compression Device    High Risk 2-12: [  ] Heparin 5,000 units SC Q8 H                              [ x ] Heparin 5,000 units SC Q 12 H                              [  ] LMWH 40 mg SC daily (CrCl > 30 ml)

## 2019-02-03 NOTE — DISCHARGE NOTE ADULT - PLAN OF CARE
Resolved Presumed secondary to Asthma exacerbation Acute exacerbation POA, resolved continue with Montelukast, Symbicort and albuterol as needed  Follow up with pulmonologist in 1 week. with mildly elevated Troponin. Resolved elevated troponin presumed to be secondary to demand ischemia.   Follow up with Cardiologist in one week. with PPM PPM interrogation benign. Stable follow up with PCP for monitoring. continue with home medications.

## 2019-02-03 NOTE — DISCHARGE NOTE ADULT - HOSPITAL COURSE
73 year old female, PMH HTN, HLD, PPM on 12/8 for sinus cecilio presents with SOB and Chest Pressure.  Patient reported that she has ongoing SOB since insertion of her Pacemaker and had been to the hospital and MD office on multiple occasions. This current episode of SOB started 2 weeks ago and started to progressively worsen since Sunday. Reports that SOB often wakes her up from her sleep and is accompanied by MAURER and fatigue. Ambulating to the bathroom will precipitate an episode of SOB.  Describes chest pressure as intermittent, non radiating and located in epigastric area.  Patient had negative CTA and was given Xanax on last visit to ED. However PMD would not prescribe her anymore as it was a controlled substance.     ER course: CXR no acute changes, Trop 0.41, (0.48, 0.54, .44) ECG:  NSR 67bpm; a-paced w prolonged AV delay; PVC    The patient was admitted to telemetry bed. Cardiology was consulted and followed the patient. Asthma was treated with Symbicort montelukast and Duonebs. Chest pain and shortness of breath resolved.  Serial troponin revealed mildly elevated troponin. The patient's PPM was interrogated and was benign. The patient will be discharge to home with PCP follow up.

## 2019-02-03 NOTE — DISCHARGE NOTE ADULT - CARE PROVIDER_API CALL
Dr Pettit-PCP,   Phone: (   )    -  Fax: (   )    -    Dr Benites-Pulmonary,   Phone: (   )    -  Fax: (   )    -    Dr Saeed-Cardiology,   Phone: (   )    -  Fax: (   )    -

## 2019-02-07 DIAGNOSIS — R07.9 CHEST PAIN, UNSPECIFIED: ICD-10-CM

## 2019-02-07 DIAGNOSIS — R06.02 SHORTNESS OF BREATH: ICD-10-CM

## 2019-02-07 DIAGNOSIS — Z95.0 PRESENCE OF CARDIAC PACEMAKER: ICD-10-CM

## 2019-02-07 DIAGNOSIS — I12.9 HYPERTENSIVE CHRONIC KIDNEY DISEASE WITH STAGE 1 THROUGH STAGE 4 CHRONIC KIDNEY DISEASE, OR UNSPECIFIED CHRONIC KIDNEY DISEASE: ICD-10-CM

## 2019-02-07 DIAGNOSIS — N18.3 CHRONIC KIDNEY DISEASE, STAGE 3 (MODERATE): ICD-10-CM

## 2019-02-07 DIAGNOSIS — J45.901 UNSPECIFIED ASTHMA WITH (ACUTE) EXACERBATION: ICD-10-CM

## 2019-02-07 DIAGNOSIS — J96.01 ACUTE RESPIRATORY FAILURE WITH HYPOXIA: ICD-10-CM

## 2019-02-11 ENCOUNTER — NON-APPOINTMENT (OUTPATIENT)
Age: 74
End: 2019-02-11

## 2019-02-11 ENCOUNTER — APPOINTMENT (OUTPATIENT)
Dept: CARDIOLOGY | Facility: CLINIC | Age: 74
End: 2019-02-11
Payer: MEDICARE

## 2019-02-11 VITALS
BODY MASS INDEX: 33.59 KG/M2 | HEART RATE: 98 BPM | SYSTOLIC BLOOD PRESSURE: 156 MMHG | WEIGHT: 209 LBS | DIASTOLIC BLOOD PRESSURE: 84 MMHG | HEIGHT: 66 IN | OXYGEN SATURATION: 97 %

## 2019-02-11 DIAGNOSIS — Z00.00 ENCOUNTER FOR GENERAL ADULT MEDICAL EXAMINATION W/OUT ABNORMAL FINDINGS: ICD-10-CM

## 2019-02-11 PROCEDURE — 99215 OFFICE O/P EST HI 40 MIN: CPT

## 2019-02-11 PROCEDURE — 93000 ELECTROCARDIOGRAM COMPLETE: CPT

## 2019-02-12 LAB
24R-OH-CALCIDIOL SERPL-MCNC: 41.9 PG/ML
ALBUMIN SERPL ELPH-MCNC: 4.2 G/DL
ALP BLD-CCNC: 99 U/L
ALT SERPL-CCNC: 30 U/L
ANION GAP SERPL CALC-SCNC: 21 MMOL/L
AST SERPL-CCNC: 18 U/L
BASOPHILS # BLD AUTO: 0.03 K/UL
BASOPHILS NFR BLD AUTO: 0.3 %
BILIRUB SERPL-MCNC: 0.2 MG/DL
BUN SERPL-MCNC: 16 MG/DL
CALCIUM SERPL-MCNC: 10.1 MG/DL
CHLORIDE SERPL-SCNC: 101 MMOL/L
CHOLEST SERPL-MCNC: 216 MG/DL
CHOLEST/HDLC SERPL: 4 RATIO
CO2 SERPL-SCNC: 23 MMOL/L
CREAT SERPL-MCNC: 1.12 MG/DL
EOSINOPHIL # BLD AUTO: 0.07 K/UL
EOSINOPHIL NFR BLD AUTO: 0.8 %
GLUCOSE SERPL-MCNC: 154 MG/DL
HBA1C MFR BLD HPLC: 6.6 %
HCT VFR BLD CALC: 40.1 %
HDLC SERPL-MCNC: 54 MG/DL
HGB BLD-MCNC: 12.3 G/DL
IMM GRANULOCYTES NFR BLD AUTO: 0.3 %
LDLC SERPL CALC-MCNC: 134 MG/DL
LYMPHOCYTES # BLD AUTO: 2.92 K/UL
LYMPHOCYTES NFR BLD AUTO: 33.6 %
MAN DIFF?: NORMAL
MCHC RBC-ENTMCNC: 26.4 PG
MCHC RBC-ENTMCNC: 30.7 GM/DL
MCV RBC AUTO: 86.1 FL
MONOCYTES # BLD AUTO: 0.33 K/UL
MONOCYTES NFR BLD AUTO: 3.8 %
NEUTROPHILS # BLD AUTO: 5.31 K/UL
NEUTROPHILS NFR BLD AUTO: 61.2 %
NT-PROBNP SERPL-MCNC: 389 PG/ML
PLATELET # BLD AUTO: 332 K/UL
POTASSIUM SERPL-SCNC: 3.5 MMOL/L
PROT SERPL-MCNC: 8.4 G/DL
RBC # BLD: 4.66 M/UL
RBC # FLD: 14.6 %
SODIUM SERPL-SCNC: 145 MMOL/L
TRIGL SERPL-MCNC: 139 MG/DL
TSH SERPL-ACNC: 0.84 UIU/ML
WBC # FLD AUTO: 8.69 K/UL

## 2019-03-13 ENCOUNTER — APPOINTMENT (OUTPATIENT)
Dept: ELECTROPHYSIOLOGY | Facility: CLINIC | Age: 74
End: 2019-03-13
Payer: MEDICARE

## 2019-03-13 PROCEDURE — 93294 REM INTERROG EVL PM/LDLS PM: CPT

## 2019-03-13 PROCEDURE — 93296 REM INTERROG EVL PM/IDS: CPT

## 2019-04-03 NOTE — ED ADULT NURSE NOTE - NSSISCREENINGQ2_ED_A_ED
Patient presented at the window for a blood pressure check. He was in [place] and stopped by for refills and decided to have his bp checked. His blood pressure has been good at home when he checks it. He has an appointment with Dr Gerard Del Valle later this month. No

## 2019-05-13 ENCOUNTER — APPOINTMENT (OUTPATIENT)
Dept: CARDIOLOGY | Facility: CLINIC | Age: 74
End: 2019-05-13
Payer: MEDICARE

## 2019-05-13 ENCOUNTER — NON-APPOINTMENT (OUTPATIENT)
Age: 74
End: 2019-05-13

## 2019-05-13 VITALS
HEART RATE: 103 BPM | SYSTOLIC BLOOD PRESSURE: 138 MMHG | WEIGHT: 212 LBS | OXYGEN SATURATION: 98 % | BODY MASS INDEX: 34.07 KG/M2 | HEIGHT: 66 IN | DIASTOLIC BLOOD PRESSURE: 93 MMHG

## 2019-05-13 PROCEDURE — 93000 ELECTROCARDIOGRAM COMPLETE: CPT

## 2019-05-13 PROCEDURE — 99215 OFFICE O/P EST HI 40 MIN: CPT

## 2019-05-14 LAB
ALBUMIN SERPL ELPH-MCNC: 4.3 G/DL
ALP BLD-CCNC: 91 U/L
ALT SERPL-CCNC: 14 U/L
ANION GAP SERPL CALC-SCNC: 11 MMOL/L
AST SERPL-CCNC: 15 U/L
BASOPHILS # BLD AUTO: 0.05 K/UL
BASOPHILS NFR BLD AUTO: 0.6 %
BILIRUB SERPL-MCNC: 0.2 MG/DL
BUN SERPL-MCNC: 14 MG/DL
CALCIUM SERPL-MCNC: 9.9 MG/DL
CHLORIDE SERPL-SCNC: 106 MMOL/L
CHOLEST SERPL-MCNC: 215 MG/DL
CHOLEST/HDLC SERPL: 4.1 RATIO
CO2 SERPL-SCNC: 24 MMOL/L
CREAT SERPL-MCNC: 1.03 MG/DL
EOSINOPHIL # BLD AUTO: 0.08 K/UL
EOSINOPHIL NFR BLD AUTO: 0.9 %
ESTIMATED AVERAGE GLUCOSE: 134 MG/DL
GLUCOSE SERPL-MCNC: 103 MG/DL
HBA1C MFR BLD HPLC: 6.3 %
HCT VFR BLD CALC: 37.8 %
HDLC SERPL-MCNC: 53 MG/DL
HGB BLD-MCNC: 12 G/DL
IMM GRANULOCYTES NFR BLD AUTO: 0.2 %
LDLC SERPL CALC-MCNC: 134 MG/DL
LYMPHOCYTES # BLD AUTO: 2.91 K/UL
LYMPHOCYTES NFR BLD AUTO: 34.2 %
MAN DIFF?: NORMAL
MCHC RBC-ENTMCNC: 26.5 PG
MCHC RBC-ENTMCNC: 31.7 GM/DL
MCV RBC AUTO: 83.6 FL
MONOCYTES # BLD AUTO: 0.45 K/UL
MONOCYTES NFR BLD AUTO: 5.3 %
NEUTROPHILS # BLD AUTO: 4.99 K/UL
NEUTROPHILS NFR BLD AUTO: 58.8 %
NT-PROBNP SERPL-MCNC: 244 PG/ML
PLATELET # BLD AUTO: 309 K/UL
POTASSIUM SERPL-SCNC: 4.7 MMOL/L
PROT SERPL-MCNC: 7.6 G/DL
RBC # BLD: 4.52 M/UL
RBC # FLD: 14 %
SODIUM SERPL-SCNC: 141 MMOL/L
TRIGL SERPL-MCNC: 138 MG/DL
TSH SERPL-ACNC: 1.66 UIU/ML
WBC # FLD AUTO: 8.5 K/UL

## 2019-06-18 NOTE — ED ADULT NURSE NOTE - NSFALLRSKASSESASSIST_ED_ALL_ED
Patient Education   Personalized Prevention Plan  You are due for the preventive services outlined below.  Your care team is available to assist you in scheduling these services.  If you have already completed any of these items, please share that information with your care team to update in your medical record.  Health Maintenance Due   Topic Date Due     Zoster (Shingles) Vaccine (1 of 2) 04/07/1997     Annual Wellness Visit  11/10/2018     PHQ-2  01/01/2019         no

## 2019-07-29 ENCOUNTER — NON-APPOINTMENT (OUTPATIENT)
Age: 74
End: 2019-07-29

## 2019-07-29 ENCOUNTER — APPOINTMENT (OUTPATIENT)
Dept: CARDIOLOGY | Facility: CLINIC | Age: 74
End: 2019-07-29
Payer: MEDICARE

## 2019-07-29 VITALS
WEIGHT: 207 LBS | SYSTOLIC BLOOD PRESSURE: 157 MMHG | HEART RATE: 80 BPM | DIASTOLIC BLOOD PRESSURE: 90 MMHG | OXYGEN SATURATION: 95 % | BODY MASS INDEX: 33.41 KG/M2

## 2019-07-29 PROCEDURE — 99215 OFFICE O/P EST HI 40 MIN: CPT

## 2019-07-29 PROCEDURE — 93000 ELECTROCARDIOGRAM COMPLETE: CPT

## 2019-08-12 ENCOUNTER — APPOINTMENT (OUTPATIENT)
Dept: CARDIOLOGY | Facility: CLINIC | Age: 74
End: 2019-08-12

## 2019-09-11 ENCOUNTER — APPOINTMENT (OUTPATIENT)
Dept: ELECTROPHYSIOLOGY | Facility: HOSPITAL | Age: 74
End: 2019-09-11

## 2019-10-28 ENCOUNTER — NON-APPOINTMENT (OUTPATIENT)
Age: 74
End: 2019-10-28

## 2019-10-28 ENCOUNTER — APPOINTMENT (OUTPATIENT)
Dept: CARDIOLOGY | Facility: CLINIC | Age: 74
End: 2019-10-28
Payer: MEDICARE

## 2019-10-28 VITALS
DIASTOLIC BLOOD PRESSURE: 97 MMHG | WEIGHT: 206 LBS | BODY MASS INDEX: 33.25 KG/M2 | HEART RATE: 74 BPM | SYSTOLIC BLOOD PRESSURE: 157 MMHG | OXYGEN SATURATION: 99 %

## 2019-10-28 PROCEDURE — 99215 OFFICE O/P EST HI 40 MIN: CPT

## 2019-10-28 PROCEDURE — 93000 ELECTROCARDIOGRAM COMPLETE: CPT

## 2019-10-29 ENCOUNTER — APPOINTMENT (OUTPATIENT)
Dept: CARDIOLOGY | Facility: CLINIC | Age: 74
End: 2019-10-29
Payer: MEDICARE

## 2019-10-29 VITALS
HEIGHT: 66 IN | WEIGHT: 206 LBS | OXYGEN SATURATION: 99 % | DIASTOLIC BLOOD PRESSURE: 86 MMHG | BODY MASS INDEX: 33.11 KG/M2 | HEART RATE: 76 BPM | SYSTOLIC BLOOD PRESSURE: 126 MMHG

## 2019-10-29 PROCEDURE — 93280 PM DEVICE PROGR EVAL DUAL: CPT

## 2020-01-16 ENCOUNTER — APPOINTMENT (OUTPATIENT)
Dept: PULMONOLOGY | Facility: CLINIC | Age: 75
End: 2020-01-16
Payer: MEDICARE

## 2020-01-16 VITALS
WEIGHT: 207 LBS | OXYGEN SATURATION: 98 % | HEART RATE: 97 BPM | DIASTOLIC BLOOD PRESSURE: 80 MMHG | SYSTOLIC BLOOD PRESSURE: 150 MMHG | RESPIRATION RATE: 17 BRPM | HEIGHT: 62 IN | BODY MASS INDEX: 38.09 KG/M2

## 2020-01-16 DIAGNOSIS — Z87.09 PERSONAL HISTORY OF OTHER DISEASES OF THE RESPIRATORY SYSTEM: ICD-10-CM

## 2020-01-16 DIAGNOSIS — Z83.3 FAMILY HISTORY OF DIABETES MELLITUS: ICD-10-CM

## 2020-01-16 DIAGNOSIS — Z86.018 PERSONAL HISTORY OF OTHER BENIGN NEOPLASM: ICD-10-CM

## 2020-01-16 DIAGNOSIS — Z63.4 DISAPPEARANCE AND DEATH OF FAMILY MEMBER: ICD-10-CM

## 2020-01-16 PROCEDURE — 94727 GAS DIL/WSHOT DETER LNG VOL: CPT

## 2020-01-16 PROCEDURE — 95012 NITRIC OXIDE EXP GAS DETER: CPT

## 2020-01-16 PROCEDURE — 99204 OFFICE O/P NEW MOD 45 MIN: CPT | Mod: 25

## 2020-01-16 PROCEDURE — 94060 EVALUATION OF WHEEZING: CPT

## 2020-01-16 PROCEDURE — 94729 DIFFUSING CAPACITY: CPT

## 2020-01-16 PROCEDURE — ZZZZZ: CPT

## 2020-01-16 PROCEDURE — 71046 X-RAY EXAM CHEST 2 VIEWS: CPT

## 2020-01-16 PROCEDURE — 94618 PULMONARY STRESS TESTING: CPT

## 2020-01-16 RX ORDER — FLUTICASONE FUROATE, UMECLIDINIUM BROMIDE AND VILANTEROL TRIFENATATE 100; 62.5; 25 UG/1; UG/1; UG/1
100-62.5-25 POWDER RESPIRATORY (INHALATION)
Qty: 3 | Refills: 1 | Status: ACTIVE | COMMUNITY
Start: 2020-01-16 | End: 1900-01-01

## 2020-01-16 RX ORDER — LORAZEPAM 1 MG/1
1 TABLET ORAL
Refills: 0 | Status: ACTIVE | COMMUNITY

## 2020-01-16 SDOH — SOCIAL STABILITY - SOCIAL INSECURITY: DISSAPEARANCE AND DEATH OF FAMILY MEMBER: Z63.4

## 2020-01-16 NOTE — PROCEDURE
[FreeTextEntry1] : 6 minute walk test reveals a low saturation of 94% with severe dyspnea; walked 456.4 meters \par \par Full PFT revealed normal flows, with a FEV1 of 1.95L, which is 92% of predicted, normal lung volumes, and a 6% improvement with bronchodilator and a diffusion of 19.9 , which is 84% of predicted, with a normal flow volume loop\par \par FENO was 10; a normal value being less than 25\par Fractional exhaled nitric oxide (FENO) is regarded as a simple, noninvasive method for assessing eosinophilic airway inflammation. Produced by a variety of cells within the lung, nitric oxide (NO) concentrations are generally low in healthy individuals. However, high concentrations of NO appear to be involved in nonspecific host defense mechanisms and chronic inflammatory diseases such as asthma. The American Thoracic Society (ATS) therefore has recommended using FENO to aid in the diagnosis and monitoring of eosinophilic airway inflammation and asthma, and for identifying steroid responsive individuals whose chronic respiratory symptoms may be caused by airway inflammation. \par \par CXR reveals a normal sized heart; left permanent pace maker, uncoiled aorta. \par 12.2018 : no PE, calcified granuloma in left lower lobe, small hiatal hernia.

## 2020-01-16 NOTE — HISTORY OF PRESENT ILLNESS
[FreeTextEntry1] : Ms. CHRISTINE is a 74 year old female presenting to the office today for initial pulmonary evaluation. Her chief complaint is poor sleep \par -she notes that she never slept well; however after her pacemaker implant her sleep got worse. \par -she notes that she does not snore. \par -she states that she feels unrested.\par -she notes that she gets a PND, , itchy eyes, itchy ears. \par -she notes that she gets a heartburn/reflux constantly. \par -she could not fall asleep in a car \par -she notes that her memory and concentration is good. \par -she notes that she is a  at night. \par -she denies any headaches, nausea, vomiting, fever, chills, sweats, chest pain, chest pressure, diarrhea, constipation, dysphagia, dizziness, leg swelling, leg pain, sour taste in the mouth, myalgias or arthralgias.\par

## 2020-01-16 NOTE — REASON FOR VISIT
[Initial Evaluation] : an initial evaluation [FreeTextEntry1] : poor sleep/ sleep evaluation/ GERD/ ?GIRISH

## 2020-01-16 NOTE — ADDENDUM
[FreeTextEntry1] : Documented by Ollie Fajardo acting as a scribe for Dr. Patrick Beach on 01/16/2020.\par \par All medical record entries made by the Scribe were at my, Dr. Patrick Beach's, direction and personally dictated by me on 01/16/2020. I have reviewed the chart and agree that the record accurately reflects my personal performance of the history, physical exam, assessment and plan. I have also personally directed, reviewed, and agree with the discharge instructions.\par

## 2020-01-16 NOTE — ASSESSMENT
[FreeTextEntry1] : Ms. CHRISTINE is a 74 year old female with a history of history of CAD, CHF, GERD, HTN, fibroids, Pacemaker, and asthma who comes into the office today for pulmonary evaluation-sleep evaluation \par \par The patient's shortness of breath is multifactorial due to:\par -pulmonary disease \par      -asthma/gerd/ GIRISH\par -poor breathing mechanics \par -overweight/out of shape\par -?cardiac disease -sees Dr. Tuttle \par \par \par Problem 1: Asthma \par -Add Trelegy 1 inhalation QD\par -Inhaler technique reviewed as well as oral hygiene techniques reviewed with patient. Avoidance of cold air, extremes of temperature, rescue inhaler should be used before exercise. Order of medication reviewed with patient. Recommended use of a cool mist humidifier in the bedroom.\par -Asthma is believed to be caused by inherited (genetic) and environmental factor, but its exact cause is unknown. Asthma may be triggered by allergens, lung infections, or irritants in the air. Asthma triggers are different for each person \par \par Problem 2:allergies/sinus\par -Add Olopatadine 0.6% at 1 sniff/nostril BID \par -Complete blood work to include: IgE level, eosinophil level, vitamin D level, food IgE level, and asthma profile \par Environmental measures for allergies were encouraged including mattress and pillow cover, air purifier, and environmental controls. \par \par Problem 3:GERD\par -Add Pepcid 40 mg QHS \par -Pantoprazole 40 mg before breakfast\par -Rule of 2s: avoid eating too much, eating too fast, eating too late, eating too spicy, eating too lousy, eating two hours before bed.\par -Things to avoid including overeating, spicy foods, tight clothing, eating within three hours of bed, this list is not all inclusive. \par -For treatment of reflux, possible options discussed including diet control, H2 blockers, PPIs, as well as coating motility agents discussed as treatment options. Timing of meals and proximity of last meal to sleep were discussed. If symptoms persist, a formal gastrointestinal evaluation is needed.\par \par Problem 4:Poor sleep ?GIRISH +/-RLS\par -Complete in lab sleep study\par Sleep apnea is associated with adverse clinical consequences which an affect most organ systems. Cardiovascular disease risk includes arrhythmias, atrial fibrillation, hypertension, coronary artery disease, and stroke. Metabolic disorders include diabetes type 2, non-alcoholic fatty liver disease. Mood disorder especially depression; and cognitive decline especially in the elderly. Associations with chronic reflux/Irvin’s esophagus some but not all inclusive. \par -Reasons include arousal consistent with hypopnea; respiratory events most prominent in REM sleep or supine position; therefore sleep staging and body position are important for accurate diagnosis and estimation of AHI. \par \par Problem 4A: R/O RLS \par -Studies to complete: iron studies\par -Add Mirapex .5 mg QHS \par Restless Legs Syndrome (RLS), also known as Wallace-Ekbom Disease, is a common sleep -related movement disorder. About 1 in 10 adults in the [place] have problems from restless leg syndrome. It also can be seen in about 2% of children. Women are twice as likely as men to have RLS. People with RLS will have symptoms most often during times when they are less active, especially at bedtime. RLS most often causes an overwhelming urge to move your legs and sometimes other parts of your body. This urge is associated with unpleasant sensations in different parts of the body. The symptoms can be mild to severe and can affect your ability to go to sleep and stay asleep. People with RLS often sleep less at night and feel more tired during the day. \par \par Problem 5: Poor Mechanics of Breathing\par - Proper breathing techniques were reviewed with an emphasis of exhalation. Patient instructed to breath in for 1 second and out for four seconds. Patient was encouraged to not talk while walking. \par \par Problem 6: Overweight\par -Weight loss, exercise, and diet control were discussed and are highly encouraged. Treatment options were given such as, aqua therapy, and contacting a nutritionist. Recommended to use the elliptical, stationary bike, less use of treadmill. Mindful eating was explained to the patient Obesity is associated with worsening asthma, shortness of breath, and potential for cardiac disease, diabetes, and other underlying medical conditions. \par \par Problem 7: Cardiac\par -recommended to follow up with a cardiologist- dr. Nayan Tuttle \par \par Problem 8: health maintenance\par -recommended influenza vaccine\par -recommended strep pneumonia vaccines: Prevnar-13 vaccine, followed by Pneumo vaccine 23 one year following\par -recommended early intervention for URIs\par -recommended regular osteoporosis evaluations\par -recommended early dermatological evaluations\par -recommended after the age of 50 to the age of 70, colonoscopy every 5 years \par \par  Follow up in 6-8 weeks\par -he  is recommended to call with any changes, questions, or concerns.\par \par

## 2020-01-27 ENCOUNTER — APPOINTMENT (OUTPATIENT)
Dept: CARDIOLOGY | Facility: CLINIC | Age: 75
End: 2020-01-27
Payer: MEDICARE

## 2020-01-27 ENCOUNTER — NON-APPOINTMENT (OUTPATIENT)
Age: 75
End: 2020-01-27

## 2020-01-27 ENCOUNTER — APPOINTMENT (OUTPATIENT)
Dept: ELECTROPHYSIOLOGY | Facility: CLINIC | Age: 75
End: 2020-01-27

## 2020-01-27 VITALS
HEART RATE: 77 BPM | BODY MASS INDEX: 38.09 KG/M2 | HEIGHT: 62 IN | SYSTOLIC BLOOD PRESSURE: 130 MMHG | DIASTOLIC BLOOD PRESSURE: 83 MMHG | WEIGHT: 207 LBS | OXYGEN SATURATION: 96 %

## 2020-01-27 PROCEDURE — 99215 OFFICE O/P EST HI 40 MIN: CPT

## 2020-01-27 PROCEDURE — 93000 ELECTROCARDIOGRAM COMPLETE: CPT

## 2020-01-28 LAB
ALBUMIN SERPL ELPH-MCNC: 4.5 G/DL
ALP BLD-CCNC: 94 U/L
ALT SERPL-CCNC: 14 U/L
ANION GAP SERPL CALC-SCNC: 14 MMOL/L
AST SERPL-CCNC: 19 U/L
BASOPHILS # BLD AUTO: 0.05 K/UL
BASOPHILS NFR BLD AUTO: 0.7 %
BILIRUB SERPL-MCNC: 0.2 MG/DL
BUN SERPL-MCNC: 17 MG/DL
CALCIUM SERPL-MCNC: 10 MG/DL
CHLORIDE SERPL-SCNC: 104 MMOL/L
CHOLEST SERPL-MCNC: 216 MG/DL
CHOLEST/HDLC SERPL: 3.5 RATIO
CO2 SERPL-SCNC: 23 MMOL/L
CREAT SERPL-MCNC: 1.08 MG/DL
EOSINOPHIL # BLD AUTO: 0.19 K/UL
EOSINOPHIL NFR BLD AUTO: 2.5 %
ESTIMATED AVERAGE GLUCOSE: 134 MG/DL
GLUCOSE SERPL-MCNC: 109 MG/DL
HBA1C MFR BLD HPLC: 6.3 %
HCT VFR BLD CALC: 39.8 %
HDLC SERPL-MCNC: 62 MG/DL
HGB BLD-MCNC: 12.5 G/DL
IMM GRANULOCYTES NFR BLD AUTO: 0.3 %
LDLC SERPL CALC-MCNC: 130 MG/DL
LYMPHOCYTES # BLD AUTO: 2.5 K/UL
LYMPHOCYTES NFR BLD AUTO: 33.5 %
MAN DIFF?: NORMAL
MCHC RBC-ENTMCNC: 26.9 PG
MCHC RBC-ENTMCNC: 31.4 GM/DL
MCV RBC AUTO: 85.6 FL
MONOCYTES # BLD AUTO: 0.4 K/UL
MONOCYTES NFR BLD AUTO: 5.4 %
NEUTROPHILS # BLD AUTO: 4.31 K/UL
NEUTROPHILS NFR BLD AUTO: 57.6 %
NT-PROBNP SERPL-MCNC: 305 PG/ML
PLATELET # BLD AUTO: 298 K/UL
POTASSIUM SERPL-SCNC: 4.7 MMOL/L
PROT SERPL-MCNC: 7.9 G/DL
RBC # BLD: 4.65 M/UL
RBC # FLD: 13.9 %
SODIUM SERPL-SCNC: 141 MMOL/L
TRIGL SERPL-MCNC: 124 MG/DL
TSH SERPL-ACNC: 1.71 UIU/ML
WBC # FLD AUTO: 7.47 K/UL

## 2020-02-03 ENCOUNTER — APPOINTMENT (OUTPATIENT)
Dept: CARDIOLOGY | Facility: CLINIC | Age: 75
End: 2020-02-03

## 2020-03-11 ENCOUNTER — APPOINTMENT (OUTPATIENT)
Dept: ELECTROPHYSIOLOGY | Facility: CLINIC | Age: 75
End: 2020-03-11
Payer: MEDICARE

## 2020-03-11 PROCEDURE — 93294 REM INTERROG EVL PM/LDLS PM: CPT

## 2020-03-11 PROCEDURE — 93296 REM INTERROG EVL PM/IDS: CPT

## 2020-03-16 ENCOUNTER — APPOINTMENT (OUTPATIENT)
Dept: PULMONOLOGY | Facility: CLINIC | Age: 75
End: 2020-03-16
Payer: MEDICARE

## 2020-03-16 VITALS
TEMPERATURE: 98.1 F | DIASTOLIC BLOOD PRESSURE: 80 MMHG | RESPIRATION RATE: 17 BRPM | OXYGEN SATURATION: 98 % | HEIGHT: 61 IN | WEIGHT: 204.8 LBS | SYSTOLIC BLOOD PRESSURE: 142 MMHG | HEART RATE: 105 BPM | BODY MASS INDEX: 38.67 KG/M2

## 2020-03-16 DIAGNOSIS — K21.9 GASTRO-ESOPHAGEAL REFLUX DISEASE W/OUT ESOPHAGITIS: ICD-10-CM

## 2020-03-16 DIAGNOSIS — G25.81 RESTLESS LEGS SYNDROME: ICD-10-CM

## 2020-03-16 DIAGNOSIS — Z72.820 SLEEP DEPRIVATION: ICD-10-CM

## 2020-03-16 DIAGNOSIS — J45.909 UNSPECIFIED ASTHMA, UNCOMPLICATED: ICD-10-CM

## 2020-03-16 DIAGNOSIS — J30.9 ALLERGIC RHINITIS, UNSPECIFIED: ICD-10-CM

## 2020-03-16 PROCEDURE — 99214 OFFICE O/P EST MOD 30 MIN: CPT | Mod: 25

## 2020-03-16 PROCEDURE — 95012 NITRIC OXIDE EXP GAS DETER: CPT

## 2020-03-16 PROCEDURE — 94729 DIFFUSING CAPACITY: CPT

## 2020-03-16 PROCEDURE — 94010 BREATHING CAPACITY TEST: CPT

## 2020-03-16 PROCEDURE — ZZZZZ: CPT

## 2020-03-16 NOTE — ASSESSMENT
[FreeTextEntry1] : Ms. CHRISTINE is a 74 year old female with a history of history of CAD, CHF, GERD, HTN, fibroids, Pacemaker, and asthma who comes into the office today for pulmonary evaluation-sleep evaluation (pending sleep study) - recent Afib on a/c\par \par The patient's shortness of breath is multifactorial due to:\par -pulmonary disease \par      -asthma/gerd/ GIRISH\par -poor breathing mechanics \par -overweight/out of shape\par -?cardiac disease -sees Dr. Tuttle \par \par \par Problem 1: Asthma (stable)\par -continue Trelegy 1 inhalation QD\par -Inhaler technique reviewed as well as oral hygiene techniques reviewed with patient. Avoidance of cold air, extremes of temperature, rescue inhaler should be used before exercise. Order of medication reviewed with patient. Recommended use of a cool mist humidifier in the bedroom.\par -Asthma is believed to be caused by inherited (genetic) and environmental factor, but its exact cause is unknown. Asthma may be triggered by allergens, lung infections, or irritants in the air. Asthma triggers are different for each person \par \par Problem 2:allergies/sinus\par -continue Olopatadine 0.6% at 1 sniff/nostril BID \par -Complete blood work to include: IgE level, eosinophil level, vitamin D level, food IgE level, and asthma profile \par Environmental measures for allergies were encouraged including mattress and pillow cover, air purifier, and environmental controls. \par \par Problem 3:GERD\par -continue Pepcid 40 mg QHS \par -Pantoprazole 40 mg before breakfast\par -Rule of 2s: avoid eating too much, eating too fast, eating too late, eating too spicy, eating too lousy, eating two hours before bed.\par -Things to avoid including overeating, spicy foods, tight clothing, eating within three hours of bed, this list is not all inclusive. \par -For treatment of reflux, possible options discussed including diet control, H2 blockers, PPIs, as well as coating motility agents discussed as treatment options. Timing of meals and proximity of last meal to sleep were discussed. If symptoms persist, a formal gastrointestinal evaluation is needed.\par \par Problem 4: Poor sleep ?GIRISH +/-RLS\par -Complete in lab sleep study - pending insurance\par Sleep apnea is associated with adverse clinical consequences which an affect most organ systems. Cardiovascular disease risk includes arrhythmias, atrial fibrillation, hypertension, coronary artery disease, and stroke. Metabolic disorders include diabetes type 2, non-alcoholic fatty liver disease. Mood disorder especially depression; and cognitive decline especially in the elderly. Associations with chronic reflux/Irvin’s esophagus some but not all inclusive. \par -Reasons include arousal consistent with hypopnea; respiratory events most prominent in REM sleep or supine position; therefore sleep staging and body position are important for accurate diagnosis and estimation of AHI. \par \par Problem 4A: R/O RLS \par -Studies to complete: iron studies\par -Add Mirapex .5 mg QHS \par Restless Legs Syndrome (RLS), also known as Wallace-Ekbom Disease, is a common sleep -related movement disorder. About 1 in 10 adults in the [place] have problems from restless leg syndrome. It also can be seen in about 2% of children. Women are twice as likely as men to have RLS. People with RLS will have symptoms most often during times when they are less active, especially at bedtime. RLS most often causes an overwhelming urge to move your legs and sometimes other parts of your body. This urge is associated with unpleasant sensations in different parts of the body. The symptoms can be mild to severe and can affect your ability to go to sleep and stay asleep. People with RLS often sleep less at night and feel more tired during the day. \par \par Problem 5: Poor Mechanics of Breathing\par - Proper breathing techniques were reviewed with an emphasis of exhalation. Patient instructed to breath in for 1 second and out for four seconds. Patient was encouraged to not talk while walking. \par \par Problem 6: Overweight\par -Weight loss, exercise, and diet control were discussed and are highly encouraged. Treatment options were given such as, aqua therapy, and contacting a nutritionist. Recommended to use the elliptical, stationary bike, less use of treadmill. Mindful eating was explained to the patient Obesity is associated with worsening asthma, shortness of breath, and potential for cardiac disease, diabetes, and other underlying medical conditions. \par \par Problem 7: Cardiac\par -recommended to follow up with a cardiologist- dr. Nayan Tuttle \par \par Problem 8: health maintenance\par -recommended influenza vaccine 2019\par -recommended strep pneumonia vaccines: Prevnar-13 vaccine, followed by Pneumo vaccine 23 one year following (completed)\par -recommended early intervention for URIs\par -recommended regular osteoporosis evaluations\par -recommended early dermatological evaluations\par -recommended after the age of 50 to the age of 70, colonoscopy every 5 years \par \par  Follow up in 3 months with [facility]\par -he  is recommended to call with any changes, questions, or concerns.\par \par

## 2020-03-16 NOTE — HISTORY OF PRESENT ILLNESS
[FreeTextEntry1] : Ms. CHRISTINE is a 74 year old female presenting to the office today for initial pulmonary evaluation. Her chief complaint is\par -she is feeling better\par -[facility], and found blood around the heart in the upper chamber, and was placed on Eliquis, and was diagnosed with afib\par -she notes her energy level is good, but is tired some days\par -she notes her weight is stable\par -she reports she is walking for exercise with no limitations\par -she notes her sleep study was not approved with the company she went to, so she is looking for other locations to have it done\par -she is s/p the flu and PNA shots\par -she denies any chest pain, chest pressure, diarrhea, constipation, dysphagia, dizziness, sour taste in the mouth, heartburn, reflux

## 2020-03-16 NOTE — PROCEDURE
[FreeTextEntry1] : Full PFT revealed normal flows, with a FEV1 of 2.00L, which is 94% of predicted, and a normal diffusion of 21.9, which is 94% of predicted, with a normal flow volume loop\par \par FENO was 6; a normal value being less than 25\par Fractional exhaled nitric oxide (FENO) is regarded as a simple, noninvasive method for assessing eosinophilic airway inflammation. Produced by a variety of cells within the lung, nitric oxide (NO) concentrations are generally low in healthy individuals. However, high concentrations of NO appear to be involved in nonspecific host defense mechanisms and chronic inflammatory diseases such as asthma. The American Thoracic Society (ATS) therefore has recommended using FENO to aid in the diagnosis and monitoring of eosinophilic airway inflammation and asthma, and for identifying steroid responsive individuals whose chronic respiratory symptoms may be caused by airway inflammation.

## 2020-03-16 NOTE — PHYSICAL EXAM
[General Appearance - Well Developed] : well developed [Normal Appearance] : normal appearance [Well Groomed] : well groomed [General Appearance - Well Nourished] : well nourished [No Deformities] : no deformities [General Appearance - In No Acute Distress] : no acute distress [Normal Conjunctiva] : the conjunctiva exhibited no abnormalities [Eyelids - No Xanthelasma] : the eyelids demonstrated no xanthelasmas [Normal Oropharynx] : normal oropharynx [III] : III [Neck Appearance] : the appearance of the neck was normal [Neck Cervical Mass (___cm)] : no neck mass was observed [Jugular Venous Distention Increased] : there was no jugular-venous distention [Thyroid Diffuse Enlargement] : the thyroid was not enlarged [Thyroid Nodule] : there were no palpable thyroid nodules [Heart Rate And Rhythm] : heart rate and rhythm were normal [Heart Sounds] : normal S1 and S2 [Murmurs] : no murmurs present [Respiration, Rhythm And Depth] : normal respiratory rhythm and effort [Exaggerated Use Of Accessory Muscles For Inspiration] : no accessory muscle use [Kyphosis] : kyphosis [Abdomen Soft] : soft [Abdomen Tenderness] : non-tender [Abdomen Mass (___ Cm)] : no abdominal mass palpated [Abnormal Walk] : normal gait [Gait - Sufficient For Exercise Testing] : the gait was sufficient for exercise testing [Nail Clubbing] : no clubbing of the fingernails [Cyanosis, Localized] : no localized cyanosis [Petechial Hemorrhages (___cm)] : no petechial hemorrhages [Skin Color & Pigmentation] : normal skin color and pigmentation [Skin Turgor] : normal skin turgor [] : no rash [Deep Tendon Reflexes (DTR)] : deep tendon reflexes were 2+ and symmetric [Sensation] : the sensory exam was normal to light touch and pinprick [No Focal Deficits] : no focal deficits [Oriented To Time, Place, And Person] : oriented to person, place, and time [Impaired Insight] : insight and judgment were intact [Affect] : the affect was normal [Auscultation Breath Sounds / Voice Sounds] : lungs were clear to auscultation bilaterally [FreeTextEntry1] : I:E ratio 1:3; clear [FreeTextEntry2] : 1+ LE Edema

## 2020-03-16 NOTE — ADDENDUM
[FreeTextEntry1] : Documented by Ollie Fajardo acting as a scribe for Dr. Patrick Beach on 03/16/2020.\par \par All medical record entries made by the Scribe were at my, Dr. Patrick Beach's, direction and personally dictated by me on 03/16/2020. I have reviewed the chart and agree that the record accurately reflects my personal performance of the history, physical exam, assessment and plan. I have also personally directed, reviewed, and agree with the discharge instructions.

## 2020-05-04 ENCOUNTER — APPOINTMENT (OUTPATIENT)
Dept: CARDIOLOGY | Facility: CLINIC | Age: 75
End: 2020-05-04

## 2020-05-18 ENCOUNTER — APPOINTMENT (OUTPATIENT)
Dept: CARDIOLOGY | Facility: CLINIC | Age: 75
End: 2020-05-18

## 2020-05-20 ENCOUNTER — APPOINTMENT (OUTPATIENT)
Dept: CARDIOLOGY | Facility: CLINIC | Age: 75
End: 2020-05-20
Payer: MEDICARE

## 2020-05-20 PROCEDURE — 99214 OFFICE O/P EST MOD 30 MIN: CPT | Mod: 95

## 2020-06-10 ENCOUNTER — APPOINTMENT (OUTPATIENT)
Dept: ELECTROPHYSIOLOGY | Facility: CLINIC | Age: 75
End: 2020-06-10
Payer: MEDICARE

## 2020-06-10 PROCEDURE — 93296 REM INTERROG EVL PM/IDS: CPT

## 2020-06-10 PROCEDURE — 93294 REM INTERROG EVL PM/LDLS PM: CPT

## 2020-06-19 ENCOUNTER — APPOINTMENT (OUTPATIENT)
Dept: PULMONOLOGY | Facility: CLINIC | Age: 75
End: 2020-06-19

## 2020-08-18 NOTE — ED PROVIDER NOTE - MEDICAL DECISION MAKING DETAILS
Pt comfortable, no cp currently, EKG without stt leonnges, paced. + trop, will admit. dr. harrell aware.
no

## 2020-09-01 ENCOUNTER — APPOINTMENT (OUTPATIENT)
Dept: CARDIOLOGY | Facility: CLINIC | Age: 75
End: 2020-09-01
Payer: MEDICARE

## 2020-09-01 ENCOUNTER — NON-APPOINTMENT (OUTPATIENT)
Age: 75
End: 2020-09-01

## 2020-09-01 VITALS — SYSTOLIC BLOOD PRESSURE: 167 MMHG | HEART RATE: 40 BPM | DIASTOLIC BLOOD PRESSURE: 79 MMHG | OXYGEN SATURATION: 98 %

## 2020-09-01 DIAGNOSIS — Z78.9 OTHER SPECIFIED HEALTH STATUS: ICD-10-CM

## 2020-09-01 DIAGNOSIS — R93.89 ABNORMAL FINDINGS ON DIAGNOSTIC IMAGING OF OTHER SPECIFIED BODY STRUCTURES: ICD-10-CM

## 2020-09-01 PROCEDURE — 99214 OFFICE O/P EST MOD 30 MIN: CPT

## 2020-09-01 PROCEDURE — 93000 ELECTROCARDIOGRAM COMPLETE: CPT

## 2020-09-09 ENCOUNTER — APPOINTMENT (OUTPATIENT)
Dept: ELECTROPHYSIOLOGY | Facility: CLINIC | Age: 75
End: 2020-09-09
Payer: MEDICARE

## 2020-09-09 PROCEDURE — 93294 REM INTERROG EVL PM/LDLS PM: CPT

## 2020-09-09 PROCEDURE — 93296 REM INTERROG EVL PM/IDS: CPT

## 2020-09-22 ENCOUNTER — APPOINTMENT (OUTPATIENT)
Dept: CARDIOLOGY | Facility: CLINIC | Age: 75
End: 2020-09-22
Payer: MEDICARE

## 2020-09-22 PROBLEM — R93.89 ABNORMAL VENTRICULAR WALL MOTION: Status: ACTIVE | Noted: 2020-09-22

## 2020-09-22 PROCEDURE — 93306 TTE W/DOPPLER COMPLETE: CPT

## 2020-10-20 ENCOUNTER — OUTPATIENT (OUTPATIENT)
Dept: OUTPATIENT SERVICES | Facility: HOSPITAL | Age: 75
LOS: 1 days | End: 2020-10-20
Payer: SELF-PAY

## 2020-10-20 ENCOUNTER — APPOINTMENT (OUTPATIENT)
Dept: CV DIAGNOSTICS | Facility: HOSPITAL | Age: 75
End: 2020-10-20

## 2020-10-20 DIAGNOSIS — Z95.0 PRESENCE OF CARDIAC PACEMAKER: Chronic | ICD-10-CM

## 2020-10-20 DIAGNOSIS — R93.89 ABNORMAL FINDINGS ON DIAGNOSTIC IMAGING OF OTHER SPECIFIED BODY STRUCTURES: ICD-10-CM

## 2020-10-20 DIAGNOSIS — Z90.710 ACQUIRED ABSENCE OF BOTH CERVIX AND UTERUS: Chronic | ICD-10-CM

## 2020-10-20 PROCEDURE — 93018 CV STRESS TEST I&R ONLY: CPT

## 2020-10-20 PROCEDURE — 78452 HT MUSCLE IMAGE SPECT MULT: CPT

## 2020-10-20 PROCEDURE — 78452 HT MUSCLE IMAGE SPECT MULT: CPT | Mod: 26

## 2020-10-20 PROCEDURE — 93017 CV STRESS TEST TRACING ONLY: CPT

## 2020-10-20 PROCEDURE — 93016 CV STRESS TEST SUPVJ ONLY: CPT

## 2020-10-20 PROCEDURE — A9500: CPT

## 2020-10-29 NOTE — ED ADULT NURSE NOTE - PAIN: BODY LOCATION
chest pain A-T Advancement Flap Text: The defect edges were debeveled with a #15 scalpel blade.  Given the location of the defect, shape of the defect and the proximity to free margins an A-T advancement flap was deemed most appropriate.  Using a sterile surgical marker, an appropriate advancement flap was drawn incorporating the defect and placing the expected incisions within the relaxed skin tension lines where possible.    The area thus outlined was incised deep to adipose tissue with a #15 scalpel blade.  The skin margins were undermined to an appropriate distance in all directions utilizing iris scissors.

## 2020-11-23 ENCOUNTER — APPOINTMENT (OUTPATIENT)
Dept: CARDIOLOGY | Facility: CLINIC | Age: 75
End: 2020-11-23
Payer: MEDICARE

## 2020-11-23 PROCEDURE — 99214 OFFICE O/P EST MOD 30 MIN: CPT | Mod: 95

## 2020-11-30 ENCOUNTER — APPOINTMENT (OUTPATIENT)
Dept: CARDIOLOGY | Facility: CLINIC | Age: 75
End: 2020-11-30
Payer: MEDICARE

## 2020-11-30 PROCEDURE — 99215 OFFICE O/P EST HI 40 MIN: CPT | Mod: 95

## 2020-11-30 NOTE — REASON FOR VISIT
[Follow-Up - Clinic] : a clinic follow-up of [Home] : at home, [unfilled] , at the time of the visit. [Medical Office: ([facility])___] : at the medical office located in  [Verbal consent obtained from patient] : the patient, [unfilled] [FreeTextEntry2] : HTN, AF, HFpEF, S/P PPM 12/2018

## 2020-11-30 NOTE — DISCUSSION/SUMMARY
[FreeTextEntry1] : 73 year old woman - history of seasonal asthma, arthritis, vertigo, HTN, HFpEF, S/P PPM 12/2018, pAF - who presents for cardiovascular evaluation. \par -----------------\par -----------------\par Nuclear Stress - 2020 - Normal (apical defect corrected with prone imaging)\par 2018 - Nl TTE\par 2018 - Nl MPI\par -----------------\par -----------------\par 1. SOB/HFpEF\par - Trial of Lasix 20mg QOD with Potassium\par - Pulmonology\par - Test for Sleep Apnea\par - Weight Loss\par \par 2. S/P PPM\par - Stable; Follow with Dr. Marc\par \par 3. HTN\par - Re-assess after Lasix trial \par \par 4. pAF\par - Eliquis \par \par \par \par \par \par \par \par \par \par \par \par \par \par \par Hypertension (Stable; Chronic). HFpEF (Active). S/P PPM (Stable; Chronic). Medication plan for these conditions noted above. \par Total Time Spent in encounter was 45 minutes. >50% time spent in counseling and coordination of care and on addressing above medical conditions in assessment.\par All labs, imaging, consulting reports, and any relevant outside records including laboratory work personally reviewed in order to evaluate, manage, and coordinate care amongst providers.  Patient-Risk (High).\par Counseled on diet, exercise, cardiovascular risk reduction for > 15 minutes.\par \par

## 2020-12-09 ENCOUNTER — APPOINTMENT (OUTPATIENT)
Dept: ELECTROPHYSIOLOGY | Facility: CLINIC | Age: 75
End: 2020-12-09
Payer: MEDICARE

## 2020-12-09 PROCEDURE — 93296 REM INTERROG EVL PM/IDS: CPT

## 2020-12-09 PROCEDURE — 93294 REM INTERROG EVL PM/LDLS PM: CPT

## 2021-03-08 ENCOUNTER — NON-APPOINTMENT (OUTPATIENT)
Age: 76
End: 2021-03-08

## 2021-03-08 ENCOUNTER — APPOINTMENT (OUTPATIENT)
Dept: CARDIOLOGY | Facility: CLINIC | Age: 76
End: 2021-03-08
Payer: MEDICARE

## 2021-03-08 VITALS
HEART RATE: 62 BPM | BODY MASS INDEX: 39.08 KG/M2 | SYSTOLIC BLOOD PRESSURE: 150 MMHG | DIASTOLIC BLOOD PRESSURE: 88 MMHG | WEIGHT: 207 LBS | OXYGEN SATURATION: 98 % | RESPIRATION RATE: 16 BRPM | HEIGHT: 61 IN

## 2021-03-08 DIAGNOSIS — R06.02 SHORTNESS OF BREATH: ICD-10-CM

## 2021-03-08 PROCEDURE — 93000 ELECTROCARDIOGRAM COMPLETE: CPT

## 2021-03-08 PROCEDURE — 99215 OFFICE O/P EST HI 40 MIN: CPT

## 2021-03-08 PROCEDURE — 99072 ADDL SUPL MATRL&STAF TM PHE: CPT

## 2021-03-08 NOTE — REASON FOR VISIT
[Follow-Up - Clinic] : a clinic follow-up of [Home] : at home, [unfilled] , at the time of the visit. [Medical Office: ([facility])___] : at the medical office located in  [Verbal consent obtained from patient] : the patient, [unfilled] [FreeTextEntry2] : HTN, AF, HFpEF, S/P PPM 12/2018

## 2021-03-08 NOTE — HISTORY OF PRESENT ILLNESS
[FreeTextEntry1] : 75 year old woman - history of seasonal asthma, arthritis, vertigo, HTN, HFpEF, S/P PPM 12/2018 - who presents for cardiovascular evaluation. \par -----------------\par ============\par ============\par 12/2018 \par Briefly, patient was admitted recently for CP. Nuclear MPI was negative. TTE was negative. Found to be bradycardic. Dr. Marc placed a PPM. \par Since PPM, still was MAURER limiting her. Previously diuretics caused cramping. \par But reports orthopnea. PND. "Fears lying flat."\par Subsequently seen in ED - likely anxiety but trial of 40 PO lasix x 2 days.\par S/P PPM - controlled on current treatment.\par HFpEF/HTN - Active\par Labs reviewed in EMR.\par -------------------\par 02/2019\par On Last visit, we planned for Lasix QOD with Potassium.\par Then, we planned to address hypertension. \par Still with MAURER. \par But prefers to stay at current dose. \par Seeing a psychiatrist for depression. \par -----------------------\par 05/2019\par Still with abrupt chest discomfort feelings. \par -----------------------\par 07/2019\par Again still with abrupt chest discomfort feelings \par - Trial of Lasix 20mg QOD with Potassium -> but now lightheadedness,\par However, likely related to obstructive sleep apnea \par Reduce Lasix to off\par ----------------------\par \par Off Lasix. Pacemaker irritation at site.\par ------------------------\par \par Re,broderick permannently off lasix. \par Seeing Dr. Beach. \par -------------------------\par \par Received a phone call from device team about an isolated, self-resolving AF episode. \par Called her to discuss starting a NOAC, but would not  on several calls.\par Will re-try. \par ------------------------\par \par BP well controlled\par On Eliquis\par Feeling well in general \par ----------------------------\par \par TTE -  - LVEF 45-50%; inferolateral wallmotion abnormality (new)\par Nuclear Stress -  - [place] corrected with prone but increased PVCs \par ------------------------------\par \par Will monitor\par --------------------------------\par \par Call cut short after 25 moins\par ---------------------------------\par \par Doing okay; no major issues. \par ---------------------------------\par \par Doing well with lorazepam for SOB \par --------------\par 0-2021\par \par ------------------------------\par \par

## 2021-03-08 NOTE — DISCUSSION/SUMMARY
[FreeTextEntry1] : 73 year old woman - history of seasonal asthma, arthritis, vertigo, HTN, HFpEF, S/P PPM 12/2018, pAF - who presents for cardiovascular evaluation. \par -----------------\par -----------------\par Nuclear Stress - 2020 - Normal (apical defect corrected with prone imaging)\par 2018 - Nl TTE\par 2018 - Nl MPI\par -----------------\par -----------------\par 1. SOB/HFpEF\par - Trial of Lasix 20mg QOD with Potassium\par - Pulmonology\par - Test for Sleep Apnea\par - Weight Loss\par \par 2. S/P PPM\par - Stable; Follow with Dr. Marc\par \par 3. HTN\par - Re-assess after Lasix trial \par \par 4. pAF\par - Eliquis \par \par \par \par \par \par \par \par \par \par \par \par \par \par \par \par \par \par \par \par \par \par \par \par \par \par \par Hypertension (Stable; Chronic). HFpEF (Active). S/P PPM (Stable; Chronic). Medication plan for these conditions noted above. \par Total Time Spent in encounter was 45 minutes. >50% time spent in counseling and coordination of care and on addressing above medical conditions in assessment.\par All labs, imaging, consulting reports, and any relevant outside records including laboratory work personally reviewed in order to evaluate, manage, and coordinate care amongst providers.  Patient-Risk (High).\par Counseled on diet, exercise, cardiovascular risk reduction for > 15 minutes.\par \par

## 2021-03-09 ENCOUNTER — NON-APPOINTMENT (OUTPATIENT)
Age: 76
End: 2021-03-09

## 2021-03-09 ENCOUNTER — APPOINTMENT (OUTPATIENT)
Dept: ELECTROPHYSIOLOGY | Facility: CLINIC | Age: 76
End: 2021-03-09
Payer: MEDICARE

## 2021-03-09 PROCEDURE — 93296 REM INTERROG EVL PM/IDS: CPT

## 2021-03-09 PROCEDURE — 93294 REM INTERROG EVL PM/LDLS PM: CPT

## 2021-06-07 ENCOUNTER — APPOINTMENT (OUTPATIENT)
Dept: CARDIOLOGY | Facility: CLINIC | Age: 76
End: 2021-06-07
Payer: MEDICARE

## 2021-06-07 VITALS
HEART RATE: 42 BPM | HEIGHT: 61 IN | DIASTOLIC BLOOD PRESSURE: 84 MMHG | RESPIRATION RATE: 14 BRPM | WEIGHT: 209 LBS | SYSTOLIC BLOOD PRESSURE: 130 MMHG | OXYGEN SATURATION: 99 % | BODY MASS INDEX: 39.46 KG/M2

## 2021-06-07 PROCEDURE — 99215 OFFICE O/P EST HI 40 MIN: CPT

## 2021-06-07 PROCEDURE — 99072 ADDL SUPL MATRL&STAF TM PHE: CPT

## 2021-06-08 LAB
ALBUMIN SERPL ELPH-MCNC: 4.4 G/DL
ALP BLD-CCNC: 93 U/L
ALT SERPL-CCNC: 19 U/L
ANION GAP SERPL CALC-SCNC: 14 MMOL/L
AST SERPL-CCNC: 20 U/L
BASOPHILS # BLD AUTO: 0.03 K/UL
BASOPHILS NFR BLD AUTO: 0.4 %
BILIRUB SERPL-MCNC: 0.2 MG/DL
BUN SERPL-MCNC: 18 MG/DL
CALCIUM SERPL-MCNC: 9.6 MG/DL
CHLORIDE SERPL-SCNC: 107 MMOL/L
CO2 SERPL-SCNC: 20 MMOL/L
CREAT SERPL-MCNC: 1.22 MG/DL
EOSINOPHIL # BLD AUTO: 0.13 K/UL
EOSINOPHIL NFR BLD AUTO: 1.6 %
ESTIMATED AVERAGE GLUCOSE: 137 MG/DL
GLUCOSE SERPL-MCNC: 89 MG/DL
HBA1C MFR BLD HPLC: 6.4 %
HCT VFR BLD CALC: 37.8 %
HGB BLD-MCNC: 12 G/DL
IMM GRANULOCYTES NFR BLD AUTO: 0.2 %
LYMPHOCYTES # BLD AUTO: 2.61 K/UL
LYMPHOCYTES NFR BLD AUTO: 32.1 %
MAN DIFF?: NORMAL
MCHC RBC-ENTMCNC: 27.1 PG
MCHC RBC-ENTMCNC: 31.7 GM/DL
MCV RBC AUTO: 85.5 FL
MONOCYTES # BLD AUTO: 0.43 K/UL
MONOCYTES NFR BLD AUTO: 5.3 %
NEUTROPHILS # BLD AUTO: 4.91 K/UL
NEUTROPHILS NFR BLD AUTO: 60.4 %
NT-PROBNP SERPL-MCNC: 717 PG/ML
PLATELET # BLD AUTO: 258 K/UL
POTASSIUM SERPL-SCNC: 4.7 MMOL/L
PROT SERPL-MCNC: 7.7 G/DL
RBC # BLD: 4.42 M/UL
RBC # FLD: 14.2 %
SODIUM SERPL-SCNC: 141 MMOL/L
TSH SERPL-ACNC: 1.25 UIU/ML
WBC # FLD AUTO: 8.13 K/UL

## 2021-06-09 ENCOUNTER — NON-APPOINTMENT (OUTPATIENT)
Age: 76
End: 2021-06-09

## 2021-06-09 ENCOUNTER — APPOINTMENT (OUTPATIENT)
Dept: ELECTROPHYSIOLOGY | Facility: CLINIC | Age: 76
End: 2021-06-09
Payer: MEDICARE

## 2021-06-09 PROCEDURE — 93294 REM INTERROG EVL PM/LDLS PM: CPT

## 2021-06-09 PROCEDURE — 93296 REM INTERROG EVL PM/IDS: CPT

## 2021-06-13 NOTE — H&P CARDIOLOGY - EXTREMITIES
Patient: Tg Calero  * No procedures listed *  Anesthesia type: epidural    Patient location: Labor and Delivery  Last vitals:   Vitals:    09/27/17 0730   BP: 122/53   Pulse: 85   Resp: 18   Temp: 37  C (98.6  F)   SpO2: 96%     Post vital signs: stable  Level of consciousness: awake and responds to simple questions  Post-anesthesia pain: pain controlled  Post-anesthesia nausea and vomiting: no  Pulmonary: unassisted, return to baseline  Cardiovascular: stable and blood pressure at baseline  Hydration: adequate  Anesthetic events: no    QCDR Measures:  ASA# 11 - Tori-op Cardiac Arrest: ASA11B - Patient did NOT experience unanticipated cardiac arrest  ASA# 12 - Tori-op Mortality Rate: ASA12B - Patient did NOT die  ASA# 13 - PACU Re-Intubation Rate: NA - No ETT / LMA used for case  ASA# 10 - Composite Anes Safety: ASA10A - No serious adverse event    Additional Notes:    Patient is comfortable s/p labor epidural. Patient is satisfied with her anesthetic and recovering well. Patient's sensory and motor function in LE are returning to baseline, emptying bladder as expected, minimal to no tenderness at neuraxial insertion site. Advised patient to alert her nurses, physicians, department of anesthesia if anything changes.     Elvia Snyder MD  Staff Anesthesiologist  [facility]     No cyanosis, clubbing or edema

## 2021-06-28 ENCOUNTER — APPOINTMENT (OUTPATIENT)
Dept: CARDIOLOGY | Facility: CLINIC | Age: 76
End: 2021-06-28
Payer: MEDICARE

## 2021-06-28 ENCOUNTER — NON-APPOINTMENT (OUTPATIENT)
Age: 76
End: 2021-06-28

## 2021-06-28 ENCOUNTER — APPOINTMENT (OUTPATIENT)
Dept: ELECTROPHYSIOLOGY | Facility: CLINIC | Age: 76
End: 2021-06-28
Payer: MEDICARE

## 2021-06-28 VITALS
BODY MASS INDEX: 34.82 KG/M2 | WEIGHT: 209 LBS | SYSTOLIC BLOOD PRESSURE: 135 MMHG | HEIGHT: 65 IN | HEART RATE: 93 BPM | OXYGEN SATURATION: 97 % | DIASTOLIC BLOOD PRESSURE: 87 MMHG

## 2021-06-28 VITALS
SYSTOLIC BLOOD PRESSURE: 150 MMHG | HEART RATE: 95 BPM | DIASTOLIC BLOOD PRESSURE: 80 MMHG | RESPIRATION RATE: 14 BRPM | BODY MASS INDEX: 39.46 KG/M2 | WEIGHT: 209 LBS | HEIGHT: 61 IN | OXYGEN SATURATION: 97 %

## 2021-06-28 DIAGNOSIS — R00.1 BRADYCARDIA, UNSPECIFIED: ICD-10-CM

## 2021-06-28 DIAGNOSIS — R42 DIZZINESS AND GIDDINESS: ICD-10-CM

## 2021-06-28 PROCEDURE — 99072 ADDL SUPL MATRL&STAF TM PHE: CPT

## 2021-06-28 PROCEDURE — 93280 PM DEVICE PROGR EVAL DUAL: CPT

## 2021-06-28 PROCEDURE — 93000 ELECTROCARDIOGRAM COMPLETE: CPT

## 2021-06-28 PROCEDURE — 99215 OFFICE O/P EST HI 40 MIN: CPT

## 2021-06-28 RX ORDER — OLOPATADINE HYDROCHLORIDE 665 UG/1
0.6 SPRAY, METERED NASAL
Qty: 3 | Refills: 1 | Status: DISCONTINUED | COMMUNITY
Start: 2020-01-16 | End: 2021-06-28

## 2021-06-28 RX ORDER — PANTOPRAZOLE 40 MG/1
40 TABLET, DELAYED RELEASE ORAL
Qty: 90 | Refills: 3 | Status: DISCONTINUED | COMMUNITY
Start: 2019-10-28 | End: 2021-06-28

## 2021-06-28 RX ORDER — FAMOTIDINE 40 MG/1
40 TABLET, FILM COATED ORAL DAILY
Qty: 90 | Refills: 3 | Status: DISCONTINUED | COMMUNITY
Start: 2019-07-29 | End: 2021-06-28

## 2021-06-28 RX ORDER — CYCLOBENZAPRINE HYDROCHLORIDE 5 MG/1
5 TABLET, FILM COATED ORAL 3 TIMES DAILY
Qty: 15 | Refills: 0 | Status: DISCONTINUED | COMMUNITY
Start: 2020-05-20 | End: 2021-06-28

## 2021-06-28 RX ORDER — PRAMIPEXOLE DIHYDROCHLORIDE 0.5 MG/1
0.5 TABLET ORAL
Qty: 90 | Refills: 3 | Status: DISCONTINUED | COMMUNITY
Start: 2020-01-16 | End: 2021-06-28

## 2021-09-13 ENCOUNTER — APPOINTMENT (OUTPATIENT)
Dept: CARDIOLOGY | Facility: CLINIC | Age: 76
End: 2021-09-13
Payer: MEDICARE

## 2021-09-13 ENCOUNTER — NON-APPOINTMENT (OUTPATIENT)
Age: 76
End: 2021-09-13

## 2021-09-13 VITALS
DIASTOLIC BLOOD PRESSURE: 75 MMHG | HEART RATE: 73 BPM | SYSTOLIC BLOOD PRESSURE: 145 MMHG | BODY MASS INDEX: 34.82 KG/M2 | HEIGHT: 65 IN | WEIGHT: 209 LBS | RESPIRATION RATE: 14 BRPM | OXYGEN SATURATION: 96 %

## 2021-09-13 PROCEDURE — 93000 ELECTROCARDIOGRAM COMPLETE: CPT

## 2021-09-13 PROCEDURE — 99215 OFFICE O/P EST HI 40 MIN: CPT

## 2021-09-23 ENCOUNTER — OUTPATIENT (OUTPATIENT)
Dept: OUTPATIENT SERVICES | Facility: HOSPITAL | Age: 76
LOS: 1 days | End: 2021-09-23
Payer: MEDICARE

## 2021-09-23 VITALS
SYSTOLIC BLOOD PRESSURE: 142 MMHG | WEIGHT: 209 LBS | DIASTOLIC BLOOD PRESSURE: 63 MMHG | TEMPERATURE: 98 F | HEART RATE: 83 BPM | RESPIRATION RATE: 16 BRPM | OXYGEN SATURATION: 97 % | HEIGHT: 66 IN

## 2021-09-23 VITALS
RESPIRATION RATE: 16 BRPM | OXYGEN SATURATION: 96 % | HEART RATE: 70 BPM | DIASTOLIC BLOOD PRESSURE: 65 MMHG | SYSTOLIC BLOOD PRESSURE: 140 MMHG

## 2021-09-23 DIAGNOSIS — R06.02 SHORTNESS OF BREATH: ICD-10-CM

## 2021-09-23 DIAGNOSIS — Z90.710 ACQUIRED ABSENCE OF BOTH CERVIX AND UTERUS: Chronic | ICD-10-CM

## 2021-09-23 DIAGNOSIS — Z95.0 PRESENCE OF CARDIAC PACEMAKER: Chronic | ICD-10-CM

## 2021-09-23 LAB
ALBUMIN SERPL ELPH-MCNC: 4.4 G/DL — SIGNIFICANT CHANGE UP (ref 3.3–5)
ALP SERPL-CCNC: 88 U/L — SIGNIFICANT CHANGE UP (ref 40–120)
ALT FLD-CCNC: 18 U/L — SIGNIFICANT CHANGE UP (ref 10–45)
ANION GAP SERPL CALC-SCNC: 16 MMOL/L — SIGNIFICANT CHANGE UP (ref 5–17)
AST SERPL-CCNC: 18 U/L — SIGNIFICANT CHANGE UP (ref 10–40)
BILIRUB SERPL-MCNC: 0.3 MG/DL — SIGNIFICANT CHANGE UP (ref 0.2–1.2)
BUN SERPL-MCNC: 16 MG/DL — SIGNIFICANT CHANGE UP (ref 7–23)
CALCIUM SERPL-MCNC: 9.8 MG/DL — SIGNIFICANT CHANGE UP (ref 8.4–10.5)
CHLORIDE SERPL-SCNC: 103 MMOL/L — SIGNIFICANT CHANGE UP (ref 96–108)
CO2 SERPL-SCNC: 22 MMOL/L — SIGNIFICANT CHANGE UP (ref 22–31)
CREAT SERPL-MCNC: 1.27 MG/DL — SIGNIFICANT CHANGE UP (ref 0.5–1.3)
GLUCOSE SERPL-MCNC: 103 MG/DL — HIGH (ref 70–99)
HCT VFR BLD CALC: 35.5 % — SIGNIFICANT CHANGE UP (ref 34.5–45)
HGB BLD-MCNC: 11.6 G/DL — SIGNIFICANT CHANGE UP (ref 11.5–15.5)
MCHC RBC-ENTMCNC: 26.9 PG — LOW (ref 27–34)
MCHC RBC-ENTMCNC: 32.7 GM/DL — SIGNIFICANT CHANGE UP (ref 32–36)
MCV RBC AUTO: 82.2 FL — SIGNIFICANT CHANGE UP (ref 80–100)
NRBC # BLD: 0 /100 WBCS — SIGNIFICANT CHANGE UP (ref 0–0)
PLATELET # BLD AUTO: 255 K/UL — SIGNIFICANT CHANGE UP (ref 150–400)
POTASSIUM SERPL-MCNC: 3.5 MMOL/L — SIGNIFICANT CHANGE UP (ref 3.5–5.3)
POTASSIUM SERPL-SCNC: 3.5 MMOL/L — SIGNIFICANT CHANGE UP (ref 3.5–5.3)
PROT SERPL-MCNC: 7.8 G/DL — SIGNIFICANT CHANGE UP (ref 6–8.3)
RBC # BLD: 4.32 M/UL — SIGNIFICANT CHANGE UP (ref 3.8–5.2)
RBC # FLD: 13.2 % — SIGNIFICANT CHANGE UP (ref 10.3–14.5)
SODIUM SERPL-SCNC: 141 MMOL/L — SIGNIFICANT CHANGE UP (ref 135–145)
WBC # BLD: 6.73 K/UL — SIGNIFICANT CHANGE UP (ref 3.8–10.5)
WBC # FLD AUTO: 6.73 K/UL — SIGNIFICANT CHANGE UP (ref 3.8–10.5)

## 2021-09-23 PROCEDURE — 85027 COMPLETE CBC AUTOMATED: CPT

## 2021-09-23 PROCEDURE — 99153 MOD SED SAME PHYS/QHP EA: CPT

## 2021-09-23 PROCEDURE — 93458 L HRT ARTERY/VENTRICLE ANGIO: CPT

## 2021-09-23 PROCEDURE — 80053 COMPREHEN METABOLIC PANEL: CPT

## 2021-09-23 PROCEDURE — 93010 ELECTROCARDIOGRAM REPORT: CPT

## 2021-09-23 PROCEDURE — C1894: CPT

## 2021-09-23 PROCEDURE — C1887: CPT

## 2021-09-23 PROCEDURE — 93571 IV DOP VEL&/PRESS C FLO 1ST: CPT | Mod: LC

## 2021-09-23 PROCEDURE — 36415 COLL VENOUS BLD VENIPUNCTURE: CPT

## 2021-09-23 PROCEDURE — 93458 L HRT ARTERY/VENTRICLE ANGIO: CPT | Mod: 26

## 2021-09-23 PROCEDURE — 93005 ELECTROCARDIOGRAM TRACING: CPT

## 2021-09-23 PROCEDURE — 99152 MOD SED SAME PHYS/QHP 5/>YRS: CPT

## 2021-09-23 PROCEDURE — C1769: CPT

## 2021-09-23 PROCEDURE — 93571 IV DOP VEL&/PRESS C FLO 1ST: CPT | Mod: 26,LC

## 2021-09-23 RX ORDER — APIXABAN 2.5 MG/1
1 TABLET, FILM COATED ORAL
Qty: 0 | Refills: 0 | DISCHARGE

## 2021-09-23 RX ORDER — POTASSIUM CHLORIDE 20 MEQ
1 PACKET (EA) ORAL
Qty: 0 | Refills: 0 | DISCHARGE

## 2021-09-23 RX ORDER — SODIUM CHLORIDE 9 MG/ML
3 INJECTION INTRAMUSCULAR; INTRAVENOUS; SUBCUTANEOUS EVERY 8 HOURS
Refills: 0 | Status: DISCONTINUED | OUTPATIENT
Start: 2021-09-23 | End: 2021-10-07

## 2021-09-23 RX ORDER — FUROSEMIDE 40 MG
1 TABLET ORAL
Qty: 0 | Refills: 0 | DISCHARGE

## 2021-09-23 NOTE — ASU DISCHARGE PLAN (ADULT/PEDIATRIC) - CARE PROVIDER_API CALL
Nayan Tuttle J  CARDIOVASCULAR DISEASE  [address] [place] [id number]  Phone: [phone]  Fax: [phone]  Established Patient  Follow Up Time: 2 weeks

## 2021-09-23 NOTE — H&P CARDIOLOGY - NSICDXPASTMEDICALHX_GEN_ALL_CORE_FT
PAST MEDICAL HISTORY:  Arthritis     Asthma, unspecified asthma severity, unspecified whether complicated, unspecified whether persistent     Atrial fibrillation     Bradyarrhythmia     HLD (hyperlipidemia)     HTN (hypertension)     Obese     Vertigo      PAST MEDICAL HISTORY:  Arthritis     Asthma, unspecified asthma severity, unspecified whether complicated, unspecified whether persistent     Atrial fibrillation     Bradyarrhythmia     HLD (hyperlipidemia)     HTN (hypertension)     Obese     Stage 3 chronic kidney disease     Vertigo

## 2021-09-23 NOTE — H&P CARDIOLOGY - HISTORY OF PRESENT ILLNESS
76 year old AA female with PMH of asthma, htn, hld, bradycardia s/p PPM and A Fib -on Eliquis (last dose 9/21 AM) presents today for [facility]. Patient reports MAURER for the past 2 months. Evaluated by Dr Keyur Tuttle and referred for angiogram.   Last NST 10/20/2020   * The left ventricle was normal in size. There is a small, mild defect in mid to distal anterior wall that is fixed,  suggestive of breast attenuation artifact. The observed defect is no longer significant with prone imaging.  * Gated wall motion analysis is performed, and shows normal wall motion with post stress LVEF of 55% and post  stress LVEDV of  103 ml.   76 year old AA female with PMH of asthma, htn, hld, bradycardia s/p PPM and A Fib -on Eliquis (last dose 9/21 AM) presents today for [facility]. Patient reports MAURER for the past 2 months. Evaluated by Dr Keyur Tuttle and referred for angiogram.     Last NST 10/20/2020   * The left ventricle was normal in size. There is a small, mild defect in mid to distal anterior wall that is fixed,  suggestive of breast attenuation artifact. The observed defect is no longer significant with prone imaging.  * Gated wall motion analysis is performed, and shows normal wall motion with post stress LVEF of 55% and post  stress LVEDV of  103 ml. Increased PVCs    TTE 9/2020 LVEF 45-50%-new inferolateral wall motion    76 year old AA female with PMH of asthma, htn, hld, bradycardia s/p PPM, A Fib -on Eliquis (last dose 9/21 AM) and stage 3 CKD  presents today for [facility]. Patient reports MAURER for the past 2 months. Evaluated by Dr Keyur Tuttle and referred for angiogram.     Last NST 10/20/2020   * The left ventricle was normal in size. There is a small, mild defect in mid to distal anterior wall that is fixed,  suggestive of breast attenuation artifact. The observed defect is no longer significant with prone imaging.  * Gated wall motion analysis is performed, and shows normal wall motion with post stress LVEF of 55% and post  stress LVEDV of  103 ml. Increased PVCs    TTE 9/2020 LVEF 45-50%-new inferolateral wall motion

## 2021-09-23 NOTE — ASU DISCHARGE PLAN (ADULT/PEDIATRIC) - ASU DC SPECIAL INSTRUCTIONSFT
Wound Care:   the day AFTER your procedure remove bandage GENTLY, and clean using  mild soap and gentle warm, water stream, pat dry. leave OPEN to air. YOU MAY SHOWER   DO NOT apply lotions, creams, ointments, powder, perfumes to your incision site  DO NOT SOAK your site for 1 week ( no baths, no pools, no tubs, etc...)  Check  your groin and /or wrist daily. A small amount of bruising, and soreness are normal    ACTIVITY: for 24 hours   - DO NOT DRIVE  - DO NOT make any important decisions or sign legal documents   - DO NOT operate heavy machineries   - you may resume sexual activity in 48 hours, unless otherwise instructed by your cardiologist     If your procedure was done through the WRIST: for the NEXT 3DAYS:  - avoid pushing, pulling, with that affected wrist   - avoid repeated movement of that hand and wrist ( eg: typing, hammering)  - DO NOT LIFT anything more than 5 lbs     If your procedure was done through the GROIN: for the NEXT 5 DAYS  - Limit climbing stairs, DO NOT soak in bathtub or pool  - no strenuous activities, pushing, pulling, straining  - Do not lift anything 10 lbs or heavier     MEDICATION:   take your medications as explained (see discharge paperwork)   If you received a STENT, you will be taking antiplatelet medications to KEEP YOUR STENT OPEN (eg: Aspirin, Plavix, Brilinta, Effient, etc).  Take as prescribed DO NOT STOP taking them without consulting with your cardiologist first.     Follow heart healthy diet recommended by your doctor,  if you smoke STOP SMOKING (may call [phone] for center of tobacco control if you need assistance)     CALL your doctor to make appointment in 2 WEEKS     ***CALL YOUR DOCTOR***  if you experience: fever, chills, body aches, or severe pain, swelling, redness, heat or yellow discharge at incision site  If you experience Bleeding or excruciating pain at the procedural site, swelling (golf ball size) at your procedural site  If you experience CHEST PAIN  If you experience extremity numbness, tingling, temperature change (of your procedural site)   If you are unable to reach your doctor, you may contact:   -Cardiology Office at [facility] at [phone] or   - [facility] [phone]  - [facility] [phone]

## 2021-09-26 ENCOUNTER — FORM ENCOUNTER (OUTPATIENT)
Age: 76
End: 2021-09-26

## 2021-09-27 PROBLEM — N18.30 CHRONIC KIDNEY DISEASE, STAGE 3 UNSPECIFIED: Chronic | Status: ACTIVE | Noted: 2021-09-23

## 2021-09-27 PROBLEM — I48.91 UNSPECIFIED ATRIAL FIBRILLATION: Chronic | Status: ACTIVE | Noted: 2021-09-23

## 2021-10-11 ENCOUNTER — APPOINTMENT (OUTPATIENT)
Dept: ELECTROPHYSIOLOGY | Facility: CLINIC | Age: 76
End: 2021-10-11

## 2021-10-25 ENCOUNTER — APPOINTMENT (OUTPATIENT)
Dept: CARDIOLOGY | Facility: CLINIC | Age: 76
End: 2021-10-25
Payer: MEDICARE

## 2021-10-25 VITALS
RESPIRATION RATE: 14 BRPM | SYSTOLIC BLOOD PRESSURE: 138 MMHG | HEART RATE: 84 BPM | WEIGHT: 205 LBS | BODY MASS INDEX: 34.16 KG/M2 | DIASTOLIC BLOOD PRESSURE: 80 MMHG | OXYGEN SATURATION: 100 % | HEIGHT: 65 IN

## 2021-10-25 PROCEDURE — 99215 OFFICE O/P EST HI 40 MIN: CPT

## 2021-12-08 ENCOUNTER — APPOINTMENT (OUTPATIENT)
Dept: ELECTROPHYSIOLOGY | Facility: CLINIC | Age: 76
End: 2021-12-08
Payer: MEDICARE

## 2021-12-08 ENCOUNTER — NON-APPOINTMENT (OUTPATIENT)
Age: 76
End: 2021-12-08

## 2021-12-08 PROCEDURE — 93296 REM INTERROG EVL PM/IDS: CPT

## 2021-12-08 PROCEDURE — 93294 REM INTERROG EVL PM/LDLS PM: CPT | Mod: NC

## 2022-01-03 ENCOUNTER — APPOINTMENT (OUTPATIENT)
Dept: ELECTROPHYSIOLOGY | Facility: CLINIC | Age: 77
End: 2022-01-03

## 2022-01-16 NOTE — DISCHARGE NOTE ADULT - LAUNCH MEDICATION RECONCILIATION
1930: Bedside shift change report given to Marguerite Beckman (oncoming nurse) by Gallo Benítez RN (offgoing nurse). Report included the following information SBAR, Intake/Output, MAR, Recent Results, Cardiac Rhythm NSR and Alarm Parameters . 0730: Bedside shift change report given to Yves CHISHOLM (oncoming nurse) by Nicholas Underwood RN (offgoing nurse). Report included the following information SBAR, Intake/Output, MAR, Recent Results, Cardiac Rhythm NSR and Alarm Parameters . SHIFT SUMMARY  Patient remained intubated, sedated and paralyzed. Sedation currently infusing as follows; precedex @0.8mcg/kg/hr, versed at 20mg/hr, and dilaudid at 6mg/hr. Nimbex remained maxxed out on nimbex overnight at 10mcg/kg/min. TOF consistency 4/4 twitches despite paralytic infusing but patient had no other physical movement and tolerated vent settings well. Tube feeds maintained, patient tolerated well. Gold maintained, total UO overnight was 1500ml. <<-----Click here for Discharge Medication Review

## 2022-03-07 ENCOUNTER — NON-APPOINTMENT (OUTPATIENT)
Age: 77
End: 2022-03-07

## 2022-03-07 ENCOUNTER — APPOINTMENT (OUTPATIENT)
Dept: CARDIOLOGY | Facility: CLINIC | Age: 77
End: 2022-03-07
Payer: MEDICARE

## 2022-03-07 VITALS
HEART RATE: 73 BPM | SYSTOLIC BLOOD PRESSURE: 132 MMHG | BODY MASS INDEX: 34.16 KG/M2 | WEIGHT: 205 LBS | DIASTOLIC BLOOD PRESSURE: 70 MMHG | HEIGHT: 65 IN | OXYGEN SATURATION: 98 %

## 2022-03-07 PROCEDURE — 93000 ELECTROCARDIOGRAM COMPLETE: CPT

## 2022-03-07 PROCEDURE — 99215 OFFICE O/P EST HI 40 MIN: CPT

## 2022-03-08 LAB
ALBUMIN SERPL ELPH-MCNC: 4.6 G/DL
ALP BLD-CCNC: 93 U/L
ALT SERPL-CCNC: 14 U/L
ANION GAP SERPL CALC-SCNC: 15 MMOL/L
AST SERPL-CCNC: 15 U/L
BASOPHILS # BLD AUTO: 0.03 K/UL
BASOPHILS NFR BLD AUTO: 0.3 %
BILIRUB SERPL-MCNC: 0.2 MG/DL
BUN SERPL-MCNC: 22 MG/DL
CALCIUM SERPL-MCNC: 9.6 MG/DL
CHLORIDE SERPL-SCNC: 106 MMOL/L
CHOLEST SERPL-MCNC: 220 MG/DL
CK SERPL-CCNC: 224 U/L
CO2 SERPL-SCNC: 22 MMOL/L
CREAT SERPL-MCNC: 1.25 MG/DL
EGFR: 45 ML/MIN/1.73M2
EOSINOPHIL # BLD AUTO: 0.1 K/UL
EOSINOPHIL NFR BLD AUTO: 1.1 %
ESTIMATED AVERAGE GLUCOSE: 128 MG/DL
GLUCOSE SERPL-MCNC: 108 MG/DL
HBA1C MFR BLD HPLC: 6.1 %
HCT VFR BLD CALC: 39.7 %
HDLC SERPL-MCNC: 63 MG/DL
HGB BLD-MCNC: 12.6 G/DL
IMM GRANULOCYTES NFR BLD AUTO: 0.2 %
LDLC SERPL CALC-MCNC: 138 MG/DL
LYMPHOCYTES # BLD AUTO: 2.13 K/UL
LYMPHOCYTES NFR BLD AUTO: 22.4 %
MAN DIFF?: NORMAL
MCHC RBC-ENTMCNC: 27.5 PG
MCHC RBC-ENTMCNC: 31.7 GM/DL
MCV RBC AUTO: 86.7 FL
MONOCYTES # BLD AUTO: 0.58 K/UL
MONOCYTES NFR BLD AUTO: 6.1 %
NEUTROPHILS # BLD AUTO: 6.64 K/UL
NEUTROPHILS NFR BLD AUTO: 69.9 %
NONHDLC SERPL-MCNC: 158 MG/DL
NT-PROBNP SERPL-MCNC: 213 PG/ML
PLATELET # BLD AUTO: 284 K/UL
POTASSIUM SERPL-SCNC: 4.1 MMOL/L
PROT SERPL-MCNC: 7.8 G/DL
RBC # BLD: 4.58 M/UL
RBC # FLD: 13.9 %
SODIUM SERPL-SCNC: 143 MMOL/L
TRIGL SERPL-MCNC: 99 MG/DL
TSH SERPL-ACNC: 1.32 UIU/ML
WBC # FLD AUTO: 9.5 K/UL

## 2022-03-14 ENCOUNTER — NON-APPOINTMENT (OUTPATIENT)
Age: 77
End: 2022-03-14

## 2022-03-14 ENCOUNTER — APPOINTMENT (OUTPATIENT)
Dept: ELECTROPHYSIOLOGY | Facility: CLINIC | Age: 77
End: 2022-03-14
Payer: MEDICARE

## 2022-03-14 PROCEDURE — 93296 REM INTERROG EVL PM/IDS: CPT

## 2022-03-14 PROCEDURE — 93294 REM INTERROG EVL PM/LDLS PM: CPT

## 2022-03-18 ENCOUNTER — APPOINTMENT (OUTPATIENT)
Dept: CARDIOLOGY | Facility: CLINIC | Age: 77
End: 2022-03-18
Payer: MEDICARE

## 2022-03-18 PROCEDURE — 99214 OFFICE O/P EST MOD 30 MIN: CPT | Mod: 95

## 2022-06-06 ENCOUNTER — APPOINTMENT (OUTPATIENT)
Dept: CARDIOLOGY | Facility: CLINIC | Age: 77
End: 2022-06-06

## 2022-06-14 ENCOUNTER — NON-APPOINTMENT (OUTPATIENT)
Age: 77
End: 2022-06-14

## 2022-06-14 ENCOUNTER — APPOINTMENT (OUTPATIENT)
Dept: ELECTROPHYSIOLOGY | Facility: CLINIC | Age: 77
End: 2022-06-14
Payer: MEDICARE

## 2022-06-14 PROCEDURE — 93296 REM INTERROG EVL PM/IDS: CPT

## 2022-06-14 PROCEDURE — 93294 REM INTERROG EVL PM/LDLS PM: CPT

## 2022-06-27 ENCOUNTER — NON-APPOINTMENT (OUTPATIENT)
Age: 77
End: 2022-06-27

## 2022-06-27 ENCOUNTER — APPOINTMENT (OUTPATIENT)
Dept: CARDIOLOGY | Facility: CLINIC | Age: 77
End: 2022-06-27

## 2022-06-27 VITALS
WEIGHT: 205 LBS | OXYGEN SATURATION: 98 % | DIASTOLIC BLOOD PRESSURE: 88 MMHG | HEIGHT: 65 IN | BODY MASS INDEX: 34.16 KG/M2 | HEART RATE: 109 BPM | SYSTOLIC BLOOD PRESSURE: 134 MMHG

## 2022-06-27 DIAGNOSIS — I10 ESSENTIAL (PRIMARY) HYPERTENSION: ICD-10-CM

## 2022-06-27 PROCEDURE — 99215 OFFICE O/P EST HI 40 MIN: CPT

## 2022-06-28 LAB
ALBUMIN SERPL ELPH-MCNC: 4.6 G/DL
ALP BLD-CCNC: 108 U/L
ALT SERPL-CCNC: 15 U/L
ANION GAP SERPL CALC-SCNC: 14 MMOL/L
AST SERPL-CCNC: 16 U/L
BASOPHILS # BLD AUTO: 0.04 K/UL
BASOPHILS NFR BLD AUTO: 0.5 %
BILIRUB SERPL-MCNC: 0.2 MG/DL
BUN SERPL-MCNC: 17 MG/DL
CALCIUM SERPL-MCNC: 10 MG/DL
CHLORIDE SERPL-SCNC: 107 MMOL/L
CHOLEST SERPL-MCNC: 213 MG/DL
CO2 SERPL-SCNC: 22 MMOL/L
CREAT SERPL-MCNC: 1.3 MG/DL
EGFR: 42 ML/MIN/1.73M2
EOSINOPHIL # BLD AUTO: 0.06 K/UL
EOSINOPHIL NFR BLD AUTO: 0.8 %
ESTIMATED AVERAGE GLUCOSE: 131 MG/DL
GLUCOSE SERPL-MCNC: 114 MG/DL
HBA1C MFR BLD HPLC: 6.2 %
HCT VFR BLD CALC: 38.2 %
HDLC SERPL-MCNC: 57 MG/DL
HGB BLD-MCNC: 12.6 G/DL
IMM GRANULOCYTES NFR BLD AUTO: 0.4 %
LDLC SERPL CALC-MCNC: 131 MG/DL
LYMPHOCYTES # BLD AUTO: 2.01 K/UL
LYMPHOCYTES NFR BLD AUTO: 25.4 %
MAN DIFF?: NORMAL
MCHC RBC-ENTMCNC: 27.9 PG
MCHC RBC-ENTMCNC: 33 GM/DL
MCV RBC AUTO: 84.5 FL
MONOCYTES # BLD AUTO: 0.5 K/UL
MONOCYTES NFR BLD AUTO: 6.3 %
NEUTROPHILS # BLD AUTO: 5.26 K/UL
NEUTROPHILS NFR BLD AUTO: 66.6 %
NONHDLC SERPL-MCNC: 155 MG/DL
NT-PROBNP SERPL-MCNC: 200 PG/ML
PLATELET # BLD AUTO: 283 K/UL
POTASSIUM SERPL-SCNC: 4.4 MMOL/L
PROT SERPL-MCNC: 8.3 G/DL
RBC # BLD: 4.52 M/UL
RBC # FLD: 13.7 %
SODIUM SERPL-SCNC: 143 MMOL/L
TRIGL SERPL-MCNC: 120 MG/DL
TSH SERPL-ACNC: 1.22 UIU/ML
WBC # FLD AUTO: 7.9 K/UL

## 2022-07-14 ENCOUNTER — APPOINTMENT (OUTPATIENT)
Dept: CARDIOLOGY | Facility: CLINIC | Age: 77
End: 2022-07-14

## 2022-07-14 PROCEDURE — 99214 OFFICE O/P EST MOD 30 MIN: CPT | Mod: 95

## 2022-07-25 PROBLEM — E66.3 OVERWEIGHT: Status: ACTIVE | Noted: 2020-01-16

## 2022-07-27 ENCOUNTER — APPOINTMENT (OUTPATIENT)
Dept: CARDIOLOGY | Facility: CLINIC | Age: 77
End: 2022-07-27

## 2022-07-27 DIAGNOSIS — E66.3 OVERWEIGHT: ICD-10-CM

## 2022-07-27 PROCEDURE — 99214 OFFICE O/P EST MOD 30 MIN: CPT | Mod: 95

## 2022-08-16 NOTE — ED ADULT NURSE NOTE - TOBACCO USE
Never smoker Topical Clindamycin Counseling: Patient counseled that this medication may cause skin irritation or allergic reactions.  In the event of skin irritation, the patient was advised to reduce the amount of the drug applied or use it less frequently.   The patient verbalized understanding of the proper use and possible adverse effects of clindamycin.  All of the patient's questions and concerns were addressed.
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
## 2022-08-30 ENCOUNTER — APPOINTMENT (OUTPATIENT)
Dept: CARDIOLOGY | Facility: CLINIC | Age: 77
End: 2022-08-30

## 2022-09-01 NOTE — ASU DISCHARGE PLAN (ADULT/PEDIATRIC) - HISTORY OF COVID-19 VACCINATION
How Many Mls Were Removed From The 40 Mg/Ml (1ml) Vial When Preparing The Injectable Solution?: 0 Kenalog Preparation: Kenalog Consent: The risks of atrophy were reviewed with the patient. Total Volume Injected (Ccs- Only Use Numbers And Decimals): 0.3 Treatment Number (Optional): 1 Detail Level: Detailed Show Inventory Tab: Hide Include Z78.9 (Other Specified Conditions Influencing Health Status) As An Associated Diagnosis?: No Validate Note Data When Using Inventory: Yes Concentration Of Solution Injected (Mg/Ml): 10.0 Medical Necessity Clause: This procedure was medically necessary because the lesions that were treated were: Yes Vaccine status unknown

## 2022-09-06 ENCOUNTER — APPOINTMENT (OUTPATIENT)
Dept: CARDIOLOGY | Facility: CLINIC | Age: 77
End: 2022-09-06

## 2022-09-06 VITALS
OXYGEN SATURATION: 97 % | SYSTOLIC BLOOD PRESSURE: 140 MMHG | HEART RATE: 61 BPM | DIASTOLIC BLOOD PRESSURE: 74 MMHG | WEIGHT: 207 LBS | RESPIRATION RATE: 14 BRPM | HEIGHT: 65 IN | BODY MASS INDEX: 34.49 KG/M2

## 2022-09-06 PROCEDURE — 99215 OFFICE O/P EST HI 40 MIN: CPT

## 2022-09-08 ENCOUNTER — APPOINTMENT (OUTPATIENT)
Dept: CARDIOLOGY | Facility: CLINIC | Age: 77
End: 2022-09-08

## 2022-09-08 PROCEDURE — 99214 OFFICE O/P EST MOD 30 MIN: CPT | Mod: 95

## 2022-09-12 ENCOUNTER — NON-APPOINTMENT (OUTPATIENT)
Age: 77
End: 2022-09-12

## 2022-09-13 ENCOUNTER — APPOINTMENT (OUTPATIENT)
Dept: ELECTROPHYSIOLOGY | Facility: CLINIC | Age: 77
End: 2022-09-13

## 2022-09-13 PROCEDURE — 93294 REM INTERROG EVL PM/LDLS PM: CPT

## 2022-09-13 PROCEDURE — 93296 REM INTERROG EVL PM/IDS: CPT

## 2022-09-14 ENCOUNTER — APPOINTMENT (OUTPATIENT)
Dept: CARDIOLOGY | Facility: CLINIC | Age: 77
End: 2022-09-14

## 2022-09-14 PROCEDURE — 99214 OFFICE O/P EST MOD 30 MIN: CPT | Mod: 95

## 2022-10-14 ENCOUNTER — APPOINTMENT (OUTPATIENT)
Dept: CARDIOLOGY | Facility: CLINIC | Age: 77
End: 2022-10-14

## 2022-10-14 DIAGNOSIS — Z71.9 COUNSELING, UNSPECIFIED: ICD-10-CM

## 2022-10-14 PROCEDURE — 99214 OFFICE O/P EST MOD 30 MIN: CPT | Mod: 95

## 2022-10-26 NOTE — ED PROCEDURE NOTE - NS ED PROC PERFORMED BY1 FT
Outpatient Care Management   - Care Plan Follow Up    Patient: Crow Green  MRN:  [id number]  Date of Service:  10/26/2022  Completed by:  Briana Tripp LMSW  Referral Date: 06/28/2022    Reason for Visit   Patient presents with    Update Plan Of Care     10/26/2022       Brief Summary: SW followed up with patient this morning via telephone. Pt did receive his new rollator. SW informed him that per [facility], a new hospital mattress would be $150. Pt stated he cannot afford that at this time, but is willing to purchase a used one. Pt also has not called back the [place] [facility] to get more information regarding the cleaning service. Pt also mentioned he is saving his money for an application fee for a senior living facility. SW will follow up in 2 weeks.     Complex Care Plan     Care plan was discussed and completed today with input from patient and/or caregiver.    Patient Instructions     Follow up in about 2 weeks (around 11/9/2022).    Todays OPCM Self-Management Care Plan was developed with the patients/caregivers input and was based on identified barriers from todays assessment.  Goals were written today with the patient/caregiver and the patient has agreed to work towards these goals to improve his/her overall well-being. Patient verbalized understanding of the care plan, goals, and all of today's instructions. Encouraged patient/caregiver to communicate with his/her physician and health care team about health conditions and the treatment plan.  Provided my contact information today and encouraged patient/caregiver to call me with any questions as needed.    
[place]

## 2022-11-09 ENCOUNTER — APPOINTMENT (OUTPATIENT)
Dept: CARDIOLOGY | Facility: CLINIC | Age: 77
End: 2022-11-09

## 2022-11-09 DIAGNOSIS — E74.39 OTHER DISORDERS OF INTESTINAL CARBOHYDRATE ABSORPTION: ICD-10-CM

## 2022-11-09 DIAGNOSIS — E66.9 OBESITY, UNSPECIFIED: ICD-10-CM

## 2022-11-09 PROCEDURE — 99214 OFFICE O/P EST MOD 30 MIN: CPT

## 2022-12-05 ENCOUNTER — NON-APPOINTMENT (OUTPATIENT)
Age: 77
End: 2022-12-05

## 2022-12-05 ENCOUNTER — APPOINTMENT (OUTPATIENT)
Dept: ELECTROPHYSIOLOGY | Facility: CLINIC | Age: 77
End: 2022-12-05

## 2022-12-05 VITALS — HEART RATE: 81 BPM | SYSTOLIC BLOOD PRESSURE: 135 MMHG | DIASTOLIC BLOOD PRESSURE: 82 MMHG | OXYGEN SATURATION: 95 %

## 2022-12-05 DIAGNOSIS — I49.5 SICK SINUS SYNDROME: ICD-10-CM

## 2022-12-05 DIAGNOSIS — Z95.0 PRESENCE OF CARDIAC PACEMAKER: ICD-10-CM

## 2022-12-05 PROCEDURE — 93000 ELECTROCARDIOGRAM COMPLETE: CPT | Mod: 59

## 2022-12-05 PROCEDURE — 99203 OFFICE O/P NEW LOW 30 MIN: CPT | Mod: 25

## 2022-12-05 PROCEDURE — 93280 PM DEVICE PROGR EVAL DUAL: CPT

## 2022-12-05 RX ORDER — SEMAGLUTIDE 1.34 MG/ML
2 INJECTION, SOLUTION SUBCUTANEOUS
Qty: 4 | Refills: 5 | Status: DISCONTINUED | COMMUNITY
Start: 2022-06-27 | End: 2022-12-05

## 2022-12-05 RX ORDER — METOPROLOL SUCCINATE 25 MG/1
25 TABLET, EXTENDED RELEASE ORAL DAILY
Qty: 90 | Refills: 3 | Status: DISCONTINUED | COMMUNITY
Start: 2019-01-31 | End: 2022-12-05

## 2022-12-08 RX ORDER — FLECAINIDE ACETATE 100 MG/1
100 TABLET ORAL
Qty: 180 | Refills: 3 | Status: ACTIVE | COMMUNITY
Start: 2022-12-05 | End: 1900-01-01

## 2022-12-08 RX ORDER — METOPROLOL SUCCINATE 25 MG/1
25 TABLET, EXTENDED RELEASE ORAL DAILY
Qty: 90 | Refills: 3 | Status: ACTIVE | COMMUNITY
Start: 2022-12-05 | End: 1900-01-01

## 2022-12-09 ENCOUNTER — APPOINTMENT (OUTPATIENT)
Dept: CARDIOLOGY | Facility: CLINIC | Age: 77
End: 2022-12-09
Payer: MEDICARE

## 2022-12-09 VITALS
SYSTOLIC BLOOD PRESSURE: 144 MMHG | OXYGEN SATURATION: 98 % | HEART RATE: 62 BPM | WEIGHT: 207 LBS | BODY MASS INDEX: 34.49 KG/M2 | DIASTOLIC BLOOD PRESSURE: 83 MMHG | HEIGHT: 65 IN

## 2022-12-09 DIAGNOSIS — I50.30 UNSPECIFIED DIASTOLIC (CONGESTIVE) HEART FAILURE: ICD-10-CM

## 2022-12-09 DIAGNOSIS — I48.91 UNSPECIFIED ATRIAL FIBRILLATION: ICD-10-CM

## 2022-12-09 PROCEDURE — 99215 OFFICE O/P EST HI 40 MIN: CPT

## 2022-12-09 RX ORDER — FUROSEMIDE 20 MG/1
20 TABLET ORAL
Qty: 90 | Refills: 3 | Status: ACTIVE | COMMUNITY
Start: 2020-09-01 | End: 1900-01-01

## 2022-12-09 RX ORDER — POTASSIUM CHLORIDE 750 MG/1
10 TABLET, FILM COATED, EXTENDED RELEASE ORAL DAILY
Qty: 90 | Refills: 3 | Status: ACTIVE | COMMUNITY
Start: 2018-12-17 | End: 1900-01-01

## 2022-12-09 RX ORDER — APIXABAN 5 MG/1
5 TABLET, FILM COATED ORAL
Qty: 180 | Refills: 3 | Status: ACTIVE | COMMUNITY
Start: 2020-03-13 | End: 1900-01-01

## 2022-12-09 NOTE — HISTORY OF PRESENT ILLNESS
[FreeTextEntry1] : I had the pleasure of seeing your patient Virgilio Quijano today in the arrhythmia clinic of [facility]. As you well know the patient is a pleasant 77 year old woman with hypertension, hyperlipidemia, diabetes, heart failure with preserved ejection fraction, paroxysmal atrial fibrillation and sinus node dysfunction with a Medtronic dual chamber pacemaker implanted in April 2019 for sinus node dysfunction.\par \par She had regadenoson nuclear stress test performed in October 2020 that demonstrated normal left ventricular wall motion and post stress left ventricular ejection fraction of 55%, small mild fixed  defect in the mid to distal anterior wall, that were not seen with prone imaging consistent with attenuation artifact.\par  \par Transthoracic echocardiography performed in 2018 demonstrates normal left ventricular size, thickness and systolic function. Calculated ejection fraction of 55%. Mildly enlarged left atrium, mild mitral annular calcification and trace tricuspid regurgitation. \par \par She complains of fatigue and palpitations. She reports that some days she feels exhausted She walks 2 blocks daily. She recalls an incident in November while she was doing laundry, she felt her heart racing and she  had to sit down and wait for it to pass. She lives in [place] with her daughter but travels to [place] for her doctor's visits. \par \par Today in clinic the patient states that she is doing well. She denies chest pain, lightheadedness, pre-syncope or syncope. Her blood pressure today was 135/82 and her pulse was 81 and regular. His EKG demonstrated sinus rhythm at 89, poor r wave progression MD interval 200 msec, QRS duration of 92 ms and QTc of 400 ms. Her dual chamber pacemaker demonstrated normal function with 10.2 year of battery life. The atrial lead sensed P waves at 0.6 mV, lead impedance of 399 ohms and a capture threshold of 0.625V @ 0.40 ms. The RV lead sensed R waves at 3.5 mV, lead impedance was measured at 418 ohms and a capture threshold of 0.875 V@0.40ms. She is atrially paced 54.5% and V paced 3.6%. 0.2% burden of atrial fibrillation with ventricular rates in atrial fibrillation ranging from . The last episode of atrial fibrillation was on the 23 rd of November lasting an hour and correlated with her symptoms.

## 2022-12-09 NOTE — PHYSICAL EXAM
[Well Developed] : well developed [Well Nourished] : well nourished [No Acute Distress] : no acute distress [Normal Conjunctiva] : normal conjunctiva [Normal Venous Pressure] : normal venous pressure [No Carotid Bruit] : no carotid bruit [Normal S1, S2] : normal S1, S2 [No Murmur] : no murmur [No Rub] : no rub [No Gallop] : no gallop [Clear Lung Fields] : clear lung fields [Good Air Entry] : good air entry [No Respiratory Distress] : no respiratory distress  [Soft] : abdomen soft [Non Tender] : non-tender [No Masses/organomegaly] : no masses/organomegaly [Normal Bowel Sounds] : normal bowel sounds [Normal Gait] : normal gait [No Edema] : no edema [No Cyanosis] : no cyanosis [No Clubbing] : no clubbing [No Varicosities] : no varicosities [No Rash] : no rash [No Skin Lesions] : no skin lesions [Moves all extremities] : moves all extremities [No Focal Deficits] : no focal deficits [Normal Speech] : normal speech [Alert and Oriented] : alert and oriented [Normal memory] : normal memory [de-identified] : Left infraclavicular incision healed well, no induration ,erythema or discharge

## 2022-12-09 NOTE — REVIEW OF SYSTEMS
[Feeling Fatigued] : feeling fatigued [Palpitations] : palpitations [Negative] : Heme/Lymph [SOB] : no shortness of breath [Dyspnea on exertion] : not dyspnea during exertion [Chest Discomfort] : no chest discomfort [Lower Ext Edema] : no extremity edema [Leg Claudication] : no intermittent leg claudication [Orthopnea] : no orthopnea [PND] : no PND [Syncope] : no syncope [Cough] : no cough [Wheezing] : no wheezing [Coughing Up Blood] : no hemoptysis [Snoring] : no snoring [Abdominal Pain] : no abdominal pain [Nausea] : no nausea [Vomiting] : no vomiting [Heartburn] : no heartburn [Change in Appetite] : no change in appetite [Change In The Stool] : no change in stool [Dysphagia] : no dysphagia [Diarrhea] : diarrhea [Constipation] : no constipation [Blood in Stool] : no blood in stool

## 2022-12-09 NOTE — DISCUSSION/SUMMARY
[FreeTextEntry1] : In summary this is a pleasant 77 year old  woman with sinus node dysfunction S/P a dual chamber pacemaker implantation, heart failure with preserved ejection fraction and symptomatic paroxysmal atrial fibrillation. We discussed at length the normal conduction system and atrial fibrillation, its etiology triggers and management strategies including rate control, rhythm control with antiarrhythmic therapy and or ablative therapy. I recommend that she start flecainide 100 mg bid and have added Toprol xl 25 mg daily to her medication regimen. She will continue therapeutic anticoagulation with apixaban 5 mg bid for thromboembolic event prophylaxis. She will return to see me in three months. All her questions were answered and she knows to contact me should any further queries arise.  [EKG obtained to assist in diagnosis and management of assessed problem(s)] : EKG obtained to assist in diagnosis and management of assessed problem(s)

## 2022-12-13 ENCOUNTER — NON-APPOINTMENT (OUTPATIENT)
Age: 77
End: 2022-12-13

## 2022-12-13 ENCOUNTER — APPOINTMENT (OUTPATIENT)
Dept: ELECTROPHYSIOLOGY | Facility: CLINIC | Age: 77
End: 2022-12-13

## 2022-12-13 PROCEDURE — 93294 REM INTERROG EVL PM/LDLS PM: CPT

## 2022-12-13 PROCEDURE — 93296 REM INTERROG EVL PM/IDS: CPT

## 2023-02-02 NOTE — PATIENT PROFILE ADULT - CENTRAL VENOUS CATHETER/PICC LINE
no Complex Repair And Bilobe Flap Text: The defect edges were debeveled with a #15 scalpel blade.  The primary defect was closed partially with a complex linear closure.  Given the location of the remaining defect, shape of the defect and the proximity to free margins a bilobe flap was deemed most appropriate for complete closure of the defect.  Using a sterile surgical marker, an appropriate advancement flap was drawn incorporating the defect and placing the expected incisions within the relaxed skin tension lines where possible.    The area thus outlined was incised deep to adipose tissue with a #15 scalpel blade.  The skin margins were undermined to an appropriate distance in all directions utilizing iris scissors.

## 2023-03-14 ENCOUNTER — NON-APPOINTMENT (OUTPATIENT)
Age: 78
End: 2023-03-14

## 2023-03-14 ENCOUNTER — APPOINTMENT (OUTPATIENT)
Dept: ELECTROPHYSIOLOGY | Facility: CLINIC | Age: 78
End: 2023-03-14
Payer: MEDICARE

## 2023-03-14 PROCEDURE — 93296 REM INTERROG EVL PM/IDS: CPT

## 2023-03-14 PROCEDURE — 93294 REM INTERROG EVL PM/LDLS PM: CPT

## 2023-03-20 ENCOUNTER — APPOINTMENT (OUTPATIENT)
Dept: CARDIOLOGY | Facility: CLINIC | Age: 78
End: 2023-03-20

## 2023-03-20 ENCOUNTER — APPOINTMENT (OUTPATIENT)
Dept: ELECTROPHYSIOLOGY | Facility: CLINIC | Age: 78
End: 2023-03-20

## 2023-05-05 NOTE — ED ADULT TRIAGE NOTE - TEMPERATURE IN CELSIUS (DEGREES C)
Chief Complaint   Patient presents with    6 Month Follow-Up    Hypertension    Check-Up   Originally  patient referred for bradycardia. And need pacer and had pacer already        Pt here for a 6 month f/u    EKG done today    No calf or thigh pain on walking    Sob  On exertion on mowing better now    Denied chest pain, dizziness, palpitation or edema    Sob and dizziness resolved after pacer    Had more energy after pacer and more active    Occasional dizziness on getting up fast- for years-resolved after pacer    Had syncope-late July 2020  predrome-NOTHING SIGNIFICANT  VERY SHORT LASTING dizziness  NO Injury  Did not seek medical advise    postdrome- NONE  No symptom of nause, or sweating or feeling tired  WAKE UP FROM SYNCOPE AND BACK TO BASELINE  Mid august slipped and fall with no LOC, was on pain medication    Hx of Bradycardia and rate of 33  BPM noted at a home    Had cardiac cath in [place] 5 yrs back and known to have LBBB then as well    nevere smoked    Factor V lieden and Hx of DVT 10 yrs back- and on [facility]  Mother had CHF,  None for cAD or pacer need    Patient Active Problem List   Diagnosis    AV block, 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 No pertinent family history.      Social History     Socioeconomic History    Marital status:      Spouse name: Sheldon Day    Number of children: 2    Years of education: 12 years    Highest education level: Not on file   Occupational History    Not on file   Tobacco Use
36.8

## 2023-06-13 ENCOUNTER — APPOINTMENT (OUTPATIENT)
Dept: ELECTROPHYSIOLOGY | Facility: CLINIC | Age: 78
End: 2023-06-13

## 2024-08-15 NOTE — ED ADULT NURSE NOTE - FINAL NURSING ELECTRONIC SIGNATURE
Take medicines as prescribed    See your family doctor in one to 2 days for further evaluation, workup, and treatment as necessary    Avoid driving or operating machinery while taking medicines as some medicines might cause drowsiness and may cause problems. Also pain medicines have potential of being addictive  so use Pain meds specially Narcotics Sparingly.    The exam and treatment you received in Emergency Room was for an urgent problem and NOT INTENDED AS COMPLETE CARE. It is important that you FOLLOW UP with a doctor for ongoing care. If your symptoms become WORSE or you DO NOT IMPROVE and you are unable to reach your health care provider, you should RETURN to the emergency department. The Emergency Room doctor has provided a PRELIMINARY INTERPRETATION of all your STUDIES. A final interpretation may be done after you are discharged. IF A CHANGE in your diagnosis or treatment is needed WE WILL CONTACT YOU. It is critical that we have a CURRENT PHONE NUMBER FOR YOU.     
28-Dec-2018 09:02

## 2025-03-06 NOTE — PHYSICAL EXAM
"3/6/2025      Ingrid Culver  [address] [facility] [address]      Dear Colleague,    Thank you for referring your patient, Ingrid Culver, to the [facility]. Please see a copy of my visit note below.     Current Eye Medications:  Artificial Tears both eyes once per day      Subjective:  Patient returns today for her annual eye health exam. She has been noticing a \"big\" difference in her vision compared to last year. She is struggling to see the TV and seeing near. When looking at the TV she states it looks \"double\", although this is not new she feels it is worsening. Patient also says she feels a \"pressure\" in both eyes at times, the left eye is worse. She hasn't noticed any floaters and has no flashing lights. She has no other ocular concerns today.      Objective:  See Ophthalmology Exam.       Assessment:  Ingrid Culver is a 89 year old female who presents with:   Encounter Diagnoses   Name Primary?     Examination of eyes and vision Yes     Pseudophakia OU s/p YAG cap OU      Esotropia chronic c prism      Dry eyes, bilateral      Posterior vitreous detachment of both eyes      Myopia of both eyes with regular astigmatism and presbyopia        Plan:  Use artificial tears at least twice per day (Refresh Plus or Refresh Optive or TheraTears. Avoid generic artificial tears or \"get the red out\" drops).     Use warm compresses daily on the eyes     Glasses prescription given - recommend updating    Porfirio Rodriguez MD  [phone]          Again, thank you for allowing me to participate in the care of your patient.        Sincerely,        Porfirio Rodriguez MD    Electronically signed" [General Appearance - Well Developed] : well developed [Normal Appearance] : normal appearance [Well Groomed] : well groomed [General Appearance - Well Nourished] : well nourished [No Deformities] : no deformities [General Appearance - In No Acute Distress] : no acute distress [Normal Conjunctiva] : the conjunctiva exhibited no abnormalities [Eyelids - No Xanthelasma] : the eyelids demonstrated no xanthelasmas [Normal Oropharynx] : normal oropharynx [Neck Appearance] : the appearance of the neck was normal [Neck Cervical Mass (___cm)] : no neck mass was observed [Jugular Venous Distention Increased] : there was no jugular-venous distention [Thyroid Diffuse Enlargement] : the thyroid was not enlarged [Thyroid Nodule] : there were no palpable thyroid nodules [Heart Rate And Rhythm] : heart rate and rhythm were normal [Heart Sounds] : normal S1 and S2 [Murmurs] : no murmurs present [Respiration, Rhythm And Depth] : normal respiratory rhythm and effort [Exaggerated Use Of Accessory Muscles For Inspiration] : no accessory muscle use [Abdomen Soft] : soft [Abdomen Tenderness] : non-tender [Abdomen Mass (___ Cm)] : no abdominal mass palpated [Abnormal Walk] : normal gait [Gait - Sufficient For Exercise Testing] : the gait was sufficient for exercise testing [Nail Clubbing] : no clubbing of the fingernails [Cyanosis, Localized] : no localized cyanosis [Petechial Hemorrhages (___cm)] : no petechial hemorrhages [Skin Color & Pigmentation] : normal skin color and pigmentation [Skin Turgor] : normal skin turgor [] : no rash [Deep Tendon Reflexes (DTR)] : deep tendon reflexes were 2+ and symmetric [Sensation] : the sensory exam was normal to light touch and pinprick [No Focal Deficits] : no focal deficits [Oriented To Time, Place, And Person] : oriented to person, place, and time [Impaired Insight] : insight and judgment were intact [Affect] : the affect was normal [III] : III [Kyphosis] : kyphosis [FreeTextEntry1] : I:E ratio 1:3; mild expiratory wheeze  [FreeTextEntry2] : 1+ LE Edema